# Patient Record
Sex: FEMALE | Race: WHITE | NOT HISPANIC OR LATINO | Employment: STUDENT | URBAN - METROPOLITAN AREA
[De-identification: names, ages, dates, MRNs, and addresses within clinical notes are randomized per-mention and may not be internally consistent; named-entity substitution may affect disease eponyms.]

---
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
## 2024-01-08 ENCOUNTER — TELEPHONE (OUTPATIENT)
Dept: GENETICS | Facility: CLINIC | Age: 19
End: 2024-01-08

## 2024-01-08 ENCOUNTER — TELEPHONE (OUTPATIENT)
Dept: HEMATOLOGY ONCOLOGY | Facility: CLINIC | Age: 19
End: 2024-01-08

## 2024-01-08 PROBLEM — Z15.09 BRCA2 GENE MUTATION POSITIVE IN FEMALE: Status: ACTIVE | Noted: 2024-01-08

## 2024-01-08 PROBLEM — Z15.02 BRCA2 GENE MUTATION POSITIVE IN FEMALE: Status: ACTIVE | Noted: 2024-01-08

## 2024-01-08 PROBLEM — Z15.01 BRCA2 GENE MUTATION POSITIVE IN FEMALE: Status: ACTIVE | Noted: 2024-01-08

## 2024-01-08 NOTE — TELEPHONE ENCOUNTER
Call Transfer   Who are you speaking with?  Patient   If it is not the patient, are they listed on an active communication consent form? N/A   Who is the patients HemOnc/SurgOnc provider? Marina Sheikh   What is the reason for this call? Patient calling to return a phone call from Marina Sheikh.  Phone number provided in note from Marina.  Patient was transferred to Marina Sheikh.   Person/Department that the call was transferred to?    Time that call was transferred?    Marina Sheikh 1:58pm   Your call will be transferred now. If you receive a voicemail, please leave a detailed message and a member of the team will return your call as soon as possible.    Did you relay this information to the caller?  Yes

## 2024-01-08 NOTE — TELEPHONE ENCOUNTER
Rylee lvm returning my call and requested a callback at [phone].     I returned her call and nupur.

## 2024-01-08 NOTE — TELEPHONE ENCOUNTER
BALDEVM requesting that Rylee call [phone] to discuss test results at her earliest convenience.

## 2024-01-08 NOTE — TELEPHONE ENCOUNTER
Post-Test Genetic Counseling Consult Note  Today I spoke with Rylee over the phone to review the results of her genetic test for hereditary cancer. We met previously on 4/5/23 for pre-test counseling.     SUMMARY:    Test(s): [facility] Cancer Panel + RNA (47 genes): APC, AILDA, AXIN2, BARD1, BMPR1A, BRCA1, BRCA2, BRIP1, CDH1, CDK4, CDKN2A, CHEK2, CTNNA1, DICER1, EPCAM, GREM1, HOXB13, KIT, MEN1, MLH1, MSH2, MSH3, MSH6, MUTYH, NBN, NF1, NTHL1, PALB2, PDGFRA, PMS2, POLD1, POLE, PTEN, RAD50, RAD51C, RAD51D, SDHA, SDHB, SDHC, SDHD, SMAD4, SMARCA4, STK11, TP53, TSC1, TSC2, VHL     Result: Positive     Variant 1:   BRCA2 [id number] ([id number]); heterozygous, pathogenic    Assessment:   The BRCA2 gene is associated with autosomal dominant hereditary breast and ovarian cancer (HBOC) syndrome ([employer] UID: 227696) and autosomal recessive Fanconi anemia.  This result is consistent with hereditary breast and ovarian cancer (HBOC) syndrome.      Hereditary breast and ovarian cancer (HBOC) syndrome  Women with a single BRCA2 pathogenic variant have approximately a 40-85% lifetime risk of breast cancer.  The risk for a second primary breast cancer within 5 years of the first breast cancer diagnosis is approximately 6.8-9%.       Women also have up to a 17-27% lifetime risk for ovarian, fallopian tube, or peritoneal cancer to age 70.      Men with a BRCA2 pathogenic variant have a 1.8-7.1% risk for breast cancer and a 19-61% risk for prostate cancer.      Men and women also have an elevated risk for melanoma and pancreatic cancer although the exact lifetime risk is not known.      Reproductive Risks:  If an individual and their partner both carry a pathogenic variant in one copy of the BRCA2 gene, there is a 25% chance that a child would inherit a pathogenic variant in both copies of the BRCA2 gene. Pathogenic variants in both copies of the BRCA2 gene cause Fanconi anemia.     Fanconi anemia is characterized by bone  marrow failure, short stature, abnormal skin pigmentation, developmental delay and malformations of the thumbs, skeletal and central nervous systems.  Individuals with Fanconi anemia also have an increased risk for leukemia and early onset solid tumors.      In some cases, it may be appropriate for a partner to undergo genetic testing to better understand the risk of having a child with this condition.  Rylee and I reviewed that her partner can consider testing for the BRCA2 gene for reproductive implications. Should her partner also be positive for a mutation in this gene, we can provide a referral to maternal fetal medicine for reproductive genetic counseling.  They can discuss options for prenatal diagnosis and/or assisted reproduction such as pre-implantation genetic testing.     Risks and Testing for Family Members:  This test result may help clarify the risk for other family members to develop cancer. This variant was inherited from Rylee's maternal side. Rylee's sisters and maternal aunts have already pursued genetic testing. We recommend that Rylee share these test results with maternal cousins who have not pursued testing yet.     If Rylee's family members have any questions or are interested in testing they can reach out to the main Genetics number at [phone] for additional information.     Management:  Management guidelines for individuals with pathogenic and likely pathogenic BRCA2 variants have been developed by the National Comprehensive Cancer Network.  Please refer to the current NCCN guidelines for the most up to date guidelines.  The recommendations listed below are specific to Rylee and are based on recommendations in the V2.2024 The Genetic/Familial High-Risk Assessment: Breast, Ovarian and Pancreatic Guideline.  These recommendations are subject to change over time and the newest guidelines should be referenced for the most up to date recommendations.       Plan:  WOMEN  Breast Cancer  Screening  -Breast awareness starting at age 18 y  -Clinical breast exam every 6-12 mo, starting at age 25 y     -Age 25-29 y, annual breast MRI screening with and without contrast (or mammogram only if MRI is unavailable) or individualized based on family history if a breast cancer diagnosis before 30 is present.    -Age 30-75y, annual mammogram and breast MRI screening with contrast    -Age ?75 y, management should be considered on an individual basis.    -Discuss option of risk-reducing mastectomy    -For women with a BRCA pathogenic/likely pathogenic variant who are treated for breast cancer and have not had a bilateral mastectomy, screening with annual mammogram and breast MRI should continue as described above.    Gynecologic Cancer Screening  Ovarian Cancer  -Recommend risk-reducing salpingo-oophorectomy (RRSO), typically between 35 and 40y, and upon completion of child bearing. See Risk-Reducing Salpingo-Oophorectomy (RRSO) Protocol in NCCN Guidelines for Ovarian Cancer - Principles of Surgery.    -Because onset of ovarian cancer in individuals with BRCA2 pathogenic/likely pathogenic variants is an average 8-10 years later than individuals with pathogenic/likely pathogenic BRCA1 variants it's reasonable to delay RRSO for management of ovarian cancer risk until 40-45y in patients with BRCA2 pathogenic/likely pathogenic variants unless age at diagnosis in the family warrants earlier age for consideration of prophylactic surgery.    -Salpingectomy alone is not the standard of care for risk reduction, although clinical trials of interval salpingectomy and delayed oophorectomy are ongoing. The concern for risk-reducing salpingectomy alone is that women are still at risk for developing ovarian cancer. In addition, in premenopausal women, oophorectomy likely reduces the risk of developing breast cancer but the magnitude is uncertain and may be gene-specific.    Skin Cancer Screening  -No specific screening  guidelines exist for melanoma, but general melanoma risk management is appropriate, such as annual full-body skin examination and minimizing UV exposure.  Rylee reportedly follows up with a dermatologist annually.     Pancreatic Cancer Screening:  Current NCCN Guidelines recommend pancreatic cancer screening for individuals with an BRCA2 pathogenic variant and a first- or second-degree relative with exocrine pancreatic cancer from the same side of the family as the identified pathogenic/likely pathogenic germline variant. Pancreatic cancer screening typically begins at age 50 (or 10 years younger than the earliest exocrine pancreatic cancer diagnosis in the family, whichever is earlier) and includes contrast-enhanced MRI/MRCP (magnetic resonance cholangiopancreatography) and/or endoscopic ultrasound (EU).      Currently there is no known family history of pancreatic cancer therefore we do not recommend pancreatic screening for Rylee at this time. We did encourage Rylee to keep her healthcare providers updated on any changes to her personal or family history as updated information may change this recommendation.    Other Screening:  There are no additional recommendations based on Rylee's result. she should continue cancer screening and medical management as clinically indicated and as determined appropriate by her healthcare providers.    Additional Information:  A healthy lifestyle will improve overall health and reduce risk for illness. Eating a healthy diet and exercising for 4 hours per week is recommended. Both diet and exercise have been shown to help maintain a healthy weight. Postmenopausal women who are overweight are at higher risk for breast cancer. Moderate to heavy alcohol use can increase the risk for some cancers. Smoking cigarettes can also increase risk for breast, lung, prostate, pancreatic and other cancers.      Summary:   Positive Result: Rylee was strongly encouraged to follow up on with our  office on an annual basis to review the most up to date guidelines as recommendations are subject to change over time

## 2024-01-22 DIAGNOSIS — F41.1 GAD (GENERALIZED ANXIETY DISORDER): ICD-10-CM

## 2024-01-22 DIAGNOSIS — F33.1 MODERATE EPISODE OF RECURRENT MAJOR DEPRESSIVE DISORDER (HCC): ICD-10-CM

## 2024-01-22 RX ORDER — BUSPIRONE HYDROCHLORIDE 10 MG/1
10 TABLET ORAL 2 TIMES DAILY
Qty: 180 TABLET | Refills: 1 | Status: SHIPPED | OUTPATIENT
Start: 2024-01-22

## 2024-01-23 ENCOUNTER — TELEMEDICINE (OUTPATIENT)
Dept: PSYCHIATRY | Facility: CLINIC | Age: 19
End: 2024-01-23
Payer: COMMERCIAL

## 2024-01-23 DIAGNOSIS — F33.1 MODERATE EPISODE OF RECURRENT MAJOR DEPRESSIVE DISORDER (HCC): Primary | ICD-10-CM

## 2024-01-23 DIAGNOSIS — F41.1 GAD (GENERALIZED ANXIETY DISORDER): ICD-10-CM

## 2024-01-23 PROCEDURE — 99214 OFFICE O/P EST MOD 30 MIN: CPT | Performed by: PHYSICIAN ASSISTANT

## 2024-01-23 NOTE — PSYCH
This note was not shared with the patient due to reasonable likelihood of causing patient harm     Virtual Regular Visit    Problem List Items Addressed This Visit    None  Visit Diagnoses       Moderate episode of recurrent major depressive disorder (HCC)    -  Primary    MARIA M (generalized anxiety disorder)              Reason for visit is   Chief Complaint   Patient presents with    Medication Management    Follow-up     Encounter provider Sofy Todd PA-C    Provider located at [address]  [address]  [facility] [id number] [phone]    Recent Visits  No visits were found meeting these conditions.  Showing recent visits within past 7 days and meeting all other requirements  Today's Visits  Date Type Provider Dept   01/23/24 Telemedicine Sofy Todd PA-C  [facility]   Showing today's visits and meeting all other requirements  Future Appointments  No visits were found meeting these conditions.  Showing future appointments within next 150 days and meeting all other requirements       After connecting through [employer], the patient was identified by name and date of birth. Rylee Jones was informed that this is a telemedicine visit and that the visit is being conducted through the Epic Embedded platform. She agrees to proceed. which may not be secure and therefore, might not be HIPAA-compliant.  My office door was closed. No one else was in the room.  She acknowledged consent and understanding of privacy and security of the video platform. The patient has agreed to participate and understands they can discontinue the visit at any time.    SUBJECTIVE:    Rylee Jones is a 18 y.o. female with a history of depression and anxiety who presents for virtual follow-up today.  She reports that she has tapered herself down on her Zoloft.  Initially she was on 75 mg at her last encounter and she decreased the  "dose to 50 mg for 3 weeks and she has been on the 25 mg dosing for 1 week.  She reports wanting to do this because she has consistently felt \"numb and emotionless\" on the Zoloft.  She would like to continue with her BuSpar as she feels it has been helpful for some mild anxiety symptoms.  She shares that she has not noticed any major increase in depressive or anxiety symptoms.  She has been using her coping skills and journaling to help with her mood.  She has not yet looked into therapeutic services at her school.  She notes that her sleep has been difficult but she feels it has been improving.  No changes with appetite.    No suicidal or homicidal thought, plan, or intent.    No auditory or visual hallucinations.  No delusions.    HPI ROS Appetite Changes and Sleep: normal appetite, normal energy level, and normal number of sleep hours    Review Of Systems:     Constitutional Negative   ENT Negative   Cardiovascular Negative   Respiratory Negative   Gastrointestinal Negative   Genitourinary Negative   Musculoskeletal Negative   Integumentary Negative   Neurological Negative   Endocrine Negative   Other Symptoms Negative and None        Substance Abuse History:    Social History     Substance and Sexual Activity   Drug Use No       Family Psychiatric History:     Family History   Problem Relation Age of Onset    No Known Problems Mother     No Known Problems Father        Social History     Socioeconomic History    Marital status: Single     Spouse name: Not on file    Number of children: Not on file    Years of education: Not on file    Highest education level: Not on file   Occupational History    Not on file   Tobacco Use    Smoking status: Never    Smokeless tobacco: Never   Substance and Sexual Activity    Alcohol use: No    Drug use: No    Sexual activity: Not on file   Other Topics Concern    Not on file   Social History Narrative    Not on file     Social Determinants of Health     Financial Resource Strain: " Not on file   Food Insecurity: Not on file   Transportation Needs: Not on file   Physical Activity: Not on file   Stress: Not on file   Social Connections: Not on file   Intimate Partner Violence: Not on file   Housing Stability: Not on file       Past Medical History:   Diagnosis Date    Closed fracture of left proximal humerus 1/16/2018    Patient denies medical problems        Past Surgical History:   Procedure Laterality Date    [facility] DIAGNOSTIC ARTHROSCOPY SHOULDER +- SYNOVIAL BX Left 4/12/2018    Procedure: ARTHROSCOPY SHOULDER, DIAGNOSTIC;  Surgeon: Scar Jamil MD;  [facility]: [facility];  Service: Orthopedics       Current Outpatient Medications   Medication Sig Dispense Refill    busPIRone (BUSPAR) 10 mg tablet TAKE 1 TABLET BY MOUTH TWICE A  tablet 1    sertraline (ZOLOFT) 50 mg tablet TAKE 1 AND 1/2 TABLETS BY MOUTH DAILY 135 tablet 1     No current facility-administered medications for this visit.        No Known Allergies    The following portions of the patient's history were reviewed and updated as appropriate: allergies, current medications, past family history, past medical history, past social history, past surgical history, and problem list.    OBJECTIVE:     Mental Status Examination:    Appearance age appropriate, casually dressed   Behavior pleasant, cooperative   Speech normal volume, normal pitch   Mood Slightly anxious   Affect Mood congruent   Thought Processes logical   Associations intact associations   Thought Content normal   Perceptual Disturbances: none   Abnormal Thoughts  Risk Potential Suicidal ideation - None  Homicidal ideation - None  Potential for aggression - No   Orientation oriented to person, place, time/date and situation   Memory recent and remote memory grossly intact   Cosciousness alert and awake   Attention Span attention span and concentration are age appropriate   Intellect Appears to be of Average Intelligence   Insight age appropriate    Judgement good   "  Muscle Strength and  Gait muscle strength and tone were normal   Language no difficulty naming common objects   Fund of Knowledge displays adequate knowledge of current events   Pain none   Pain Scale 0        Laboratory Results: No results found for this or any previous visit.    Assessment/Plan:       Diagnoses and all orders for this visit:    Moderate episode of recurrent major depressive disorder (HCC)    MARIA M (generalized anxiety disorder)          Treatment Recommendations- Risks Benefits      Patient has been decreasing her dosing of Zoloft on her own due to feeling \"numb and emotionless.\" She is currently taking 25 mg. We discussed importance of speaking with a Provider prior to making medication changes.     Recommend continuing with 25 mg dosing for one week and then discontinue.     Continue the following medication:     Buspar 10 mg BID for anxiety    We will follow up in 1 month or sooner if questions or concerns arise  She is aware of emergent vs non-emergent mental health resources  She is able to contract for safety at this time     Risks, Benefits And Possible Side Effects Of Medications:  discussed    Controlled Medication Discussion: not applicable     Psychotherapy Provided: no    Treatment Plan:    Completed and signed during the session: Not applicable - Treatment Plan not due at this session    I spent 21 minutes with the patient during this visit.    Sofy Todd PA-C 01/23/24  "

## 2024-02-21 PROBLEM — J06.9 ACUTE URI: Status: RESOLVED | Noted: 2018-12-13 | Resolved: 2024-02-21

## 2024-02-21 PROBLEM — B30.9 VIRAL CONJUNCTIVITIS OF BOTH EYES: Status: RESOLVED | Noted: 2019-10-12 | Resolved: 2024-02-21

## 2024-02-23 ENCOUNTER — TELEMEDICINE (OUTPATIENT)
Dept: PSYCHIATRY | Facility: CLINIC | Age: 19
End: 2024-02-23

## 2024-02-23 DIAGNOSIS — Z91.199 NO-SHOW FOR APPOINTMENT: Primary | ICD-10-CM

## 2024-02-23 NOTE — PSYCH
No Call. No Show. No Charge    Rylee Jones no showed 02/23/24 appointment , staff called and left message to reschedule appointment     Treatment Plan not due at this session.

## 2024-04-05 ENCOUNTER — OFFICE VISIT (OUTPATIENT)
Dept: PSYCHIATRY | Facility: CLINIC | Age: 19
End: 2024-04-05
Payer: COMMERCIAL

## 2024-04-05 DIAGNOSIS — F41.1 GAD (GENERALIZED ANXIETY DISORDER): ICD-10-CM

## 2024-04-05 DIAGNOSIS — F33.1 MODERATE EPISODE OF RECURRENT MAJOR DEPRESSIVE DISORDER (HCC): Primary | ICD-10-CM

## 2024-04-05 PROCEDURE — 99214 OFFICE O/P EST MOD 30 MIN: CPT | Performed by: PHYSICIAN ASSISTANT

## 2024-04-05 RX ORDER — BUPROPION HYDROCHLORIDE 150 MG/1
150 TABLET ORAL EVERY MORNING
Qty: 30 TABLET | Refills: 1 | Status: SHIPPED | OUTPATIENT
Start: 2024-04-05 | End: 2024-06-04

## 2024-04-05 NOTE — PSYCH
"  This note was not shared with the patient due to reasonable likelihood of causing patient harm   PROGRESS NOTE        [facility]      Name and Date of Birth:  Rylee Jones 18 y.o. 2005    Date of Visit: 04/05/24    SUBJECTIVE:    Rylee presents today for medication management and follow-up.  She reports \"I have been feeling pretty good.\"  She is very busy with finishing up her semester at school.  She has been utilizing coping skills such as making bread and painting.  She has noticed some mood changes since stopping Zoloft.  She feels more depressed and has less motivation.  She does report that she does not like the way the Zoloft made her feel in other ways and she would like to talk about trialing an alternative medication.    She denies any major changes in sleep or appetite.      She denies suicidal ideation, intent or plan at present, has no suicidal ideation, intent or plan at present.    She denies any auditory hallucinations and visual hallucinations, denies any other delusional thinking, denies any delusional thinking.    She denies any side effects from medications  .  HPI ROS Appetite Changes and Sleep: normal appetite, normal sleep    Review Of Systems:      Constitutional Negative   ENT Negative   Cardiovascular Negative   Respiratory Negative   Gastrointestinal Negative   Genitourinary Negative   Musculoskeletal Negative   Integumentary Negative   Neurological Negative   Endocrine Negative   Other Symptoms Negative and None       Laboratory Results: No results found for this or any previous visit.    Substance Abuse History:    Social History     Substance and Sexual Activity   Drug Use No       Family Psychiatric History:     Family History   Problem Relation Age of Onset    No Known Problems Mother     No Known Problems Father        The following portions of the patient's history were reviewed and updated as appropriate: past family history, " past medical history, past social history, past surgical history and problem list.    Social History     Socioeconomic History    Marital status: Single     Spouse name: Not on file    Number of children: Not on file    Years of education: Not on file    Highest education level: Not on file   Occupational History    Not on file   Tobacco Use    Smoking status: Never    Smokeless tobacco: Never   Substance and Sexual Activity    Alcohol use: No    Drug use: No    Sexual activity: Not on file   Other Topics Concern    Not on file   Social History Narrative    Not on file     Social Determinants of Health     Financial Resource Strain: Not on file   Food Insecurity: Not on file   Transportation Needs: Not on file   Physical Activity: Not on file   Stress: Not on file   Social Connections: Not on file   Intimate Partner Violence: Not on file   Housing Stability: Not on file     Social History     Social History Narrative    Not on file        Social History       Tobacco History       Smoking Status  Never      Smokeless Tobacco Use  Never              Alcohol History       Alcohol Use Status  No              Drug Use       Drug Use Status  No              Sexual Activity       Sexually Active  Not Asked              Activities of Daily Living    Not Asked                       OBJECTIVE:     Mental Status Evaluation:    Appearance age appropriate, casually dressed   Behavior pleasant, cooperative   Speech normal volume, normal pitch   Mood Anxious   Affect Mood congruent   Thought Processes logical   Associations intact associations   Thought Content normal   Perceptual Disturbances: none   Abnormal Thoughts  Risk Potential Suicidal ideation - None  Homicidal ideation - None  Potential for aggression - No   Orientation oriented to person, place, time/date and situation   Memory recent and remote memory grossly intact   Cosciousness alert and awake   Attention Span attention span and concentration are age appropriate    Intellect Appears to be of Average Intelligence   Insight age appropriate    Judgement good    Muscle Strength and  Gait muscle strength and tone were normal   Language no difficulty naming common objects   Fund of Knowledge displays adequate knowledge of current events   Pain none   Pain Scale 0       Assessment/Plan:       Diagnoses and all orders for this visit:    Moderate episode of recurrent major depressive disorder (HCC)  -     buPROPion (Wellbutrin XL) 150 mg 24 hr tablet; Take 1 tablet (150 mg total) by mouth every morning    MARIA M (generalized anxiety disorder)          Treatment Recommendations/Precautions:    Patient is currently looking for an outside psychotherapist.    Patient discontinued Zoloft and she does not want to restart though she mentions feeling more depressed and less motivated.  She would like to trial an alternative medication.  Discussed indication, potential side effects, and benefits of Wellbutrin XL we specifically discussed potential for lowering appetite, lowering seizure threshold, and advised not to combine the medication with alcohol.    Will start Wellbutrin  mg in the morning for depression    Continue current medications:    BuSpar 10 mg twice daily for anxiety    We will follow-up in 6 weeks or sooner if questions or concerns arise.  She is aware of emergent versus nonemergent mental health resources.  She is able to contract for her own safety at this time.    Risks/Benefits      Risks, Benefits And Possible Side Effects Of Medications:    Risks, benefits, and possible side effects of medications explained to patient and patient verbalizes understanding and agreement for treatment.    Controlled Medication Discussion:     Not applicable    Psychotherapy Provided:     Individual psychotherapy provided: No    Visit Start Time:  10:30 AM  Visit End Time:  10:50 AM  Total Visit Duration: 20 minutes    This note was completed in part utilizing Dragon dictation Software.  Grammatical, translation, syntax errors, random word insertions, spelling mistakes, and incomplete sentences may be an occasional consequence of this system secondary to software limitations with voice recognition, ambient noise, and hardware issues. If you have any questions or concerns about the content, text, or information contained within the body of this dictation, please contact the provider for clarification.

## 2024-04-28 DIAGNOSIS — F33.1 MODERATE EPISODE OF RECURRENT MAJOR DEPRESSIVE DISORDER (HCC): ICD-10-CM

## 2024-04-29 RX ORDER — BUPROPION HYDROCHLORIDE 150 MG/1
150 TABLET ORAL EVERY MORNING
Qty: 90 TABLET | Refills: 1 | Status: SHIPPED | OUTPATIENT
Start: 2024-04-29

## 2024-05-20 ENCOUNTER — OFFICE VISIT (OUTPATIENT)
Dept: PSYCHIATRY | Facility: CLINIC | Age: 19
End: 2024-05-20
Payer: COMMERCIAL

## 2024-05-20 DIAGNOSIS — F33.1 MODERATE EPISODE OF RECURRENT MAJOR DEPRESSIVE DISORDER (HCC): Primary | ICD-10-CM

## 2024-05-20 DIAGNOSIS — F41.1 GAD (GENERALIZED ANXIETY DISORDER): ICD-10-CM

## 2024-05-20 PROCEDURE — 99214 OFFICE O/P EST MOD 30 MIN: CPT | Performed by: PHYSICIAN ASSISTANT

## 2024-05-20 RX ORDER — BUPROPION HYDROCHLORIDE 300 MG/1
300 TABLET ORAL EVERY MORNING
Qty: 30 TABLET | Refills: 2 | Status: SHIPPED | OUTPATIENT
Start: 2024-05-20 | End: 2024-08-18

## 2024-05-20 NOTE — BH TREATMENT PLAN
TREATMENT PLAN (Medication Management Only)        [facility]    Name and Date of Birth:  Rylee Jones 18 y.o. 2005  Date of Treatment Plan: May 20, 2024  Diagnosis/Diagnoses:    1. Moderate episode of recurrent major depressive disorder (HCC)      Strengths/Personal Resources for Self-Care: supportive family, taking medications as prescribed, ability to adapt to life changes, ability to communicate needs, ability to communicate well.  Area/Areas of need (in own words): finding a medication that is a good fit - and no side effects   1. Long Term Goal: maintain control of depression.  Target Date:6 months - 11/20/2024  Person/Persons responsible for completion of goal: Rylee  2.  Short Term Objective (s) - How will we reach this goal?:   A. Provider new recommended medication/dosage changes and/or continue medication(s): continue current medications as prescribed.  B. Increase wellbutrin XL for better efficacy   C. N/A.  Target Date:6 months - 11/20/2024  Person/Persons Responsible for Completion of Goal: Rylee  Progress Towards Goals: continuing treatment  Treatment Modality: medication management every 3 months  Review due 180 days from date of this plan: 6 months - 11/20/2024  Expected length of service: ongoing treatment  My Physician/PA/NP and I have developed this plan together and I agree to work on the goals and objectives. I understand the treatment goals that were developed for my treatment.

## 2024-05-20 NOTE — BH CRISIS PLAN
Client Name: Rylee Jones       Client YOB: 2005    [employer] Safety Plan      Creation Date: 5/20/24 Update Date: 5/20/24   Created By: Sofy Todd PA-C Last Updated By: Sofy Todd PA-C      Step 1: Warning Signs:   Warning Signs   Feeling overwhelemed and not knowing how to express my emotions   Having thoughts to hit my head on a wall            Step 2: Internal Coping Strategies:   Internal Coping Strategies   Journaling   Baking   Crotchet   Working out            Step 3: People and social settings that provide distraction:   Name Contact Information   My sister In cell phone            Step 4: People whom I can ask for help during a crisis:      Name Contact Information    My sister In cell phone      Step 5: Professionals or agencies I can contact during a crisis:      [employer]/Agency Name Phone Emergency Contact    Sofy Todd PA-C          Crisis Phone Numbers:   Suicide Prevention Lifeline: Call or Text  984 Crisis Text Line: Text HOME to 310-160   Please note: Some [facility] do not have a separate number for Child/Adolescent specific crisis. If your county is not listed under Child/Adolescent, please call the adult number for your county      Adult Crisis Numbers: Child/Adolescent Crisis Numbers   [facility]: [phone] [facility]: [phone]   [facility]: [phone] [facility]: [phone]   [facility]: [phone] [place]: [phone]   [facility]: [phone] [place]: [phone]   [facility]: [phone]   [facility]: [phone]   [facility]: [phone]   [facility]: [phone] (daytime) [phone] (after hours, weekends, holidays)      Step 6: Making the environment safer (plan for lethal means safety):   Patient did not identify any lethal methods: Yes     Optional: What is most important to me and worth living for?      [employer] Safety Plan.  Kelsey Smiley and Lakhwinder Alfred. Used with permission of the authors.

## 2024-05-20 NOTE — PSYCH
This note was not shared with the patient due to reasonable likelihood of causing patient harm   PROGRESS NOTE        [facility]      Name and Date of Birth:  Rylee Jones 18 y.o. 2005    Date of Visit: 05/20/24    SUBJECTIVE:    Rylee presents today for medication management and follow-up.  She reports that she has been done with her semester since the beginning of May.  She starts her job as a  later this month.  She has been engaging in a lot of her hobbies such as baking and crocheting.  She denies any benefit to starting Wellbutrin.  She denies any negative side effects.  She feels that she has had a lot of low mood symptoms, low motivation and energy.  She reports that it takes a lot of effort to push herself to do things.  She did recently move in with her father which has been an adjustment because she has not lived with him since her childhood.  She also misses her sister who she previously lived with.  She has been using marijuana about 4 times per week.  She denies any alcohol use.    She has been sleeping and getting adequate nutrition.    She denies suicidal ideation, intent or plan at present, has no suicidal ideation, intent or plan at present.    She denies any auditory hallucinations and visual hallucinations, denies any other delusional thinking, denies any delusional thinking.    She denies any side effects from medications  .  HPI ROS Appetite Changes and Sleep: normal appetite, normal sleep    Review Of Systems:      Constitutional Negative   ENT Negative   Cardiovascular Negative   Respiratory Negative   Gastrointestinal Negative   Genitourinary Negative   Musculoskeletal Negative   Integumentary Negative   Neurological Negative   Endocrine Negative   Other Symptoms Negative and None       Laboratory Results: No results found for this or any previous visit.    Substance Abuse History:    Social History     Substance and Sexual  Activity   Drug Use No       Family Psychiatric History:     Family History   Problem Relation Age of Onset    No Known Problems Mother     No Known Problems Father        The following portions of the patient's history were reviewed and updated as appropriate: past family history, past medical history, past social history, past surgical history and problem list.    Social History     Socioeconomic History    Marital status: Single     Spouse name: Not on file    Number of children: Not on file    Years of education: Not on file    Highest education level: Not on file   Occupational History    Not on file   Tobacco Use    Smoking status: Never    Smokeless tobacco: Never   Substance and Sexual Activity    Alcohol use: No    Drug use: No    Sexual activity: Not on file   Other Topics Concern    Not on file   Social History Narrative    Not on file     Social Determinants of Health     Financial Resource Strain: Not on file   Food Insecurity: Not on file   Transportation Needs: Not on file   Physical Activity: Not on file   Stress: Not on file   Social Connections: Not on file   Intimate Partner Violence: Not on file   Housing Stability: Not on file     Social History     Social History Narrative    Not on file        Social History       Tobacco History       Smoking Status  Never      Smokeless Tobacco Use  Never              Alcohol History       Alcohol Use Status  No              Drug Use       Drug Use Status  No              Sexual Activity       Sexually Active  Not Asked              Activities of Daily Living    Not Asked                       OBJECTIVE:     Mental Status Evaluation:    Appearance age appropriate, casually dressed   Behavior pleasant, cooperative   Speech normal volume, normal pitch   Mood Anxious, slightly dysphoric   Affect Mood congruent   Thought Processes logical   Associations intact associations   Thought Content normal   Perceptual Disturbances: none   Abnormal Thoughts  Risk  Potential Suicidal ideation - None  Homicidal ideation - None  Potential for aggression - No   Orientation oriented to person, place, time/date and situation   Memory recent and remote memory grossly intact   Cosciousness alert and awake   Attention Span attention span and concentration are age appropriate   Intellect Appears to be of Average Intelligence   Insight age appropriate    Judgement good    Muscle Strength and  Gait muscle strength and tone were normal   Language no difficulty naming common objects   Fund of Knowledge displays adequate knowledge of current events   Pain none   Pain Scale 0       Assessment/Plan:       Diagnoses and all orders for this visit:    Moderate episode of recurrent major depressive disorder (HCC)  -     buPROPion (WELLBUTRIN XL) 300 mg 24 hr tablet; Take 1 tablet (300 mg total) by mouth every morning    MAIRA M (generalized anxiety disorder)          Treatment Recommendations/Precautions:    Patient request referral be sent internally for psychotherapy.    Will increase Wellbutrin XL to 300 mg for depressed mood    Continue current medications:    BuSpar 10 mg twice daily for anxiety    We will follow-up in 6 weeks or sooner if questions or concerns arise.  She is aware of emergent versus nonemergent mental health resources.  She is able to contract for her own safety at this time.    Risks/Benefits      Risks, Benefits And Possible Side Effects Of Medications:    Risks, benefits, and possible side effects of medications explained to patient and patient verbalizes understanding and agreement for treatment.    Controlled Medication Discussion:     Not applicable    Psychotherapy Provided:     Individual psychotherapy provided: No    Visit Start Time:  11:30 AM  Visit End Time:  11:52 AM  Total Visit Duration: 22 minutes    This note was completed in part utilizing Dragon dictation Software. Grammatical, translation, syntax errors, random word insertions, spelling mistakes, and  incomplete sentences may be an occasional consequence of this system secondary to software limitations with voice recognition, ambient noise, and hardware issues. If you have any questions or concerns about the content, text, or information contained within the body of this dictation, please contact the provider for clarification.

## 2024-06-12 ENCOUNTER — TELEPHONE (OUTPATIENT)
Dept: PSYCHIATRY | Facility: CLINIC | Age: 19
End: 2024-06-12

## 2024-06-12 NOTE — TELEPHONE ENCOUNTER
The patient LVM at the office, requesting a call back. The writer attempted to contact the patient. The writer LVM for the patient to contact the office for assistance.

## 2024-06-20 NOTE — TELEPHONE ENCOUNTER
Patient called and lvm requesting call back, writer called patient back and lvm for her to call back at her earliest convenience

## 2024-07-17 ENCOUNTER — TELEMEDICINE (OUTPATIENT)
Dept: PSYCHIATRY | Facility: CLINIC | Age: 19
End: 2024-07-17
Payer: COMMERCIAL

## 2024-07-17 DIAGNOSIS — F41.1 GAD (GENERALIZED ANXIETY DISORDER): ICD-10-CM

## 2024-07-17 DIAGNOSIS — F33.1 MODERATE EPISODE OF RECURRENT MAJOR DEPRESSIVE DISORDER (HCC): Primary | ICD-10-CM

## 2024-07-17 PROCEDURE — 99214 OFFICE O/P EST MOD 30 MIN: CPT | Performed by: PHYSICIAN ASSISTANT

## 2024-07-17 RX ORDER — BUPROPION HYDROCHLORIDE 300 MG/1
300 TABLET ORAL EVERY MORNING
Qty: 30 TABLET | Refills: 2 | Status: SHIPPED | OUTPATIENT
Start: 2024-07-17 | End: 2024-10-15

## 2024-07-17 RX ORDER — SPIRONOLACTONE 50 MG/1
50 TABLET, FILM COATED ORAL DAILY
COMMUNITY
Start: 2024-07-02

## 2024-07-17 RX ORDER — BUSPIRONE HYDROCHLORIDE 10 MG/1
10 TABLET ORAL 2 TIMES DAILY
Qty: 180 TABLET | Refills: 1 | Status: SHIPPED | OUTPATIENT
Start: 2024-07-17

## 2024-07-17 NOTE — PSYCH
This note was not shared with the patient due to reasonable likelihood of causing patient harm     Virtual Regular Visit    Problem List Items Addressed This Visit    None  Visit Diagnoses       Moderate episode of recurrent major depressive disorder (HCC)    -  Primary    Relevant Medications    busPIRone (BUSPAR) 10 mg tablet    buPROPion (WELLBUTRIN XL) 300 mg 24 hr tablet    MARIA M (generalized anxiety disorder)        Relevant Medications    busPIRone (BUSPAR) 10 mg tablet    buPROPion (WELLBUTRIN XL) 300 mg 24 hr tablet          Reason for visit is   Chief Complaint   Patient presents with    Medication Management    Follow-up     Encounter provider Sofy Todd PA-C    Provider located at [facility] [address]  [address]  [facility] [id number] [phone]    Recent Visits  No visits were found meeting these conditions.  Showing recent visits within past 7 days and meeting all other requirements  Today's Visits  Date Type Provider Dept   07/17/24 Telemedicine Sofy Todd PA-C  [facility]   Showing today's visits and meeting all other requirements  Future Appointments  No visits were found meeting these conditions.  Showing future appointments within next 150 days and meeting all other requirements       After connecting through [email or web address], the patient was identified by name and date of birth. Rylee Jones was informed that this is a telemedicine visit and that the visit is being conducted through the Epic Embedded platform. She agrees to proceed. which may not be secure and therefore, might not be HIPAA-compliant.  My office door was closed. No one else was in the room.  She acknowledged consent and understanding of privacy and security of the video platform. The patient has agreed to participate and understands they can discontinue the visit at any time.    SUBJECTIVE:    Rylee Jones is a 18 y.o. female  with a history of depression and anxiety who presents for virtual follow-up today.  She reports that she is currently on vacation so she is presenting for virtual appointment.  She reports that she has been doing fairly well over the last few weeks.  She is been working a lot.  She is spending time outside of working with friends and her sister.  She is excited to go back to school for the fall semester.  She has new roommates that she was just assigned.    She does note a little bit higher level of anxiety especially while driving.  She has been better about taking her BuSpar twice a day.  She does feel that it is helpful.  She is unsure if the increase in Wellbutrin has made her anxiety heightened.  At this time she would like to remain on her current medications and dosing.  She may be interested in making a change at the next appointment.    Today she does bring up needing paperwork filled out for an emotional support animal.  She does have a cat named Kam that she would like to take with her to her school housing.    No suicidal or homicidal thought, plan, or intent.    No auditory or visual hallucinations.  No delusions.    HPI ROS Appetite Changes and Sleep: normal appetite, normal energy level, and normal number of sleep hours    Review Of Systems:     Constitutional Negative   ENT Negative   Cardiovascular Negative   Respiratory Negative   Gastrointestinal Negative   Genitourinary Negative   Musculoskeletal Negative   Integumentary Negative   Neurological Negative   Endocrine Negative   Other Symptoms Negative and None        Substance Abuse History:    Social History     Substance and Sexual Activity   Drug Use No       Family Psychiatric History:     Family History   Problem Relation Age of Onset    No Known Problems Mother     No Known Problems Father        Social History     Socioeconomic History    Marital status: Single     Spouse name: Not on file    Number of children: Not on file    Years of  education: Not on file    Highest education level: Not on file   Occupational History    Not on file   Tobacco Use    Smoking status: Never    Smokeless tobacco: Never   Substance and Sexual Activity    Alcohol use: No    Drug use: No    Sexual activity: Not on file   Other Topics Concern    Not on file   Social History Narrative    Not on file     Social Determinants of Health     Financial Resource Strain: Not on file   Food Insecurity: Not on file   Transportation Needs: Not on file   Physical Activity: Not on file   Stress: Not on file   Social Connections: Not on file   Intimate Partner Violence: Not on file   Housing Stability: Not on file       Past Medical History:   Diagnosis Date    Closed fracture of left proximal humerus 1/16/2018    Patient denies medical problems        Past Surgical History:   Procedure Laterality Date    DE DIAGNOSTIC ARTHROSCOPY SHOULDER +- SYNOVIAL BX Left 4/12/2018    Procedure: ARTHROSCOPY SHOULDER, DIAGNOSTIC;  Surgeon: Scar Jamil MD;  Location: [facility];  Service: Orthopedics       Current Outpatient Medications   Medication Sig Dispense Refill    buPROPion (WELLBUTRIN XL) 300 mg 24 hr tablet Take 1 tablet (300 mg total) by mouth every morning 30 tablet 2    busPIRone (BUSPAR) 10 mg tablet Take 1 tablet (10 mg total) by mouth 2 (two) times a day 180 tablet 1    spironolactone (ALDACTONE) 50 mg tablet Take 50 mg by mouth daily       No current facility-administered medications for this visit.        No Known Allergies    The following portions of the patient's history were reviewed and updated as appropriate: allergies, current medications, past family history, past medical history, past social history, past surgical history, and problem list.    OBJECTIVE:     Mental Status Examination:    Appearance age appropriate, casually dressed   Behavior pleasant, cooperative   Speech normal volume, normal pitch   Mood Neutral   Affect Mood congruent   Thought Processes logical    Associations intact associations   Thought Content normal   Perceptual Disturbances: none   Abnormal Thoughts  Risk Potential Suicidal ideation - None  Homicidal ideation - None  Potential for aggression - No   Orientation oriented to person, place, time/date and situation   Memory recent and remote memory grossly intact   Cosciousness alert and awake   Attention Span attention span and concentration are age appropriate   Intellect Appears to be of Average Intelligence   Insight age appropriate    Judgement good    Muscle Strength and  Gait muscle strength and tone were normal   Language no difficulty naming common objects   Fund of Knowledge displays adequate knowledge of current events   Pain none   Pain Scale 0        Laboratory Results: No results found for this or any previous visit.    Assessment/Plan:       Diagnoses and all orders for this visit:    Moderate episode of recurrent major depressive disorder (HCC)  -     busPIRone (BUSPAR) 10 mg tablet; Take 1 tablet (10 mg total) by mouth 2 (two) times a day  -     buPROPion (WELLBUTRIN XL) 300 mg 24 hr tablet; Take 1 tablet (300 mg total) by mouth every morning    MARIA M (generalized anxiety disorder)  -     busPIRone (BUSPAR) 10 mg tablet; Take 1 tablet (10 mg total) by mouth 2 (two) times a day    Other orders  -     spironolactone (ALDACTONE) 50 mg tablet; Take 50 mg by mouth daily          Treatment Recommendations- Risks Benefits      Patient may want to consider alternative medication options at the next appointment.    Continue the following medication:       Wellbutrin  mg in the morning for depressed mood  Buspar 10 mg BID for anxiety    We will follow up in 1-2 months or sooner if questions or concerns arise  She is aware of emergent vs non-emergent mental health resources  She is able to contract for safety at this time     Risks, Benefits And Possible Side Effects Of Medications:  discussed    Controlled Medication Discussion: not applicable      Psychotherapy Provided: no    Treatment Plan:    Completed and signed during the session: Not applicable - Treatment Plan not due at this session    I spent 20 minutes with the patient during this visit.    Sofy Todd PA-C 07/17/24

## 2024-07-28 DIAGNOSIS — F33.1 MODERATE EPISODE OF RECURRENT MAJOR DEPRESSIVE DISORDER (HCC): ICD-10-CM

## 2024-07-29 ENCOUNTER — TELEPHONE (OUTPATIENT)
Dept: PSYCHIATRY | Facility: CLINIC | Age: 19
End: 2024-07-29

## 2024-07-29 RX ORDER — BUPROPION HYDROCHLORIDE 300 MG/1
300 TABLET ORAL EVERY MORNING
Qty: 90 TABLET | Refills: 1 | Status: SHIPPED | OUTPATIENT
Start: 2024-07-29 | End: 2024-10-27

## 2024-07-29 NOTE — TELEPHONE ENCOUNTER
Mom drop form to be filled out but a YEFRI need to be signed by pt in person at the office or one can be sent e-mail but would have to be notarized, sent back PDF and original send back to office by mail.

## 2024-07-29 NOTE — TELEPHONE ENCOUNTER
Patient came in office and completed YEFRI for [employer] (Emotional Support Animal).  Patient picked up letter completed by Sofy Todd PA-C

## 2024-08-12 ENCOUNTER — TELEPHONE (OUTPATIENT)
Age: 19
End: 2024-08-12

## 2024-08-12 ENCOUNTER — TELEMEDICINE (OUTPATIENT)
Dept: PSYCHIATRY | Facility: CLINIC | Age: 19
End: 2024-08-12
Payer: COMMERCIAL

## 2024-08-12 DIAGNOSIS — F33.1 MODERATE EPISODE OF RECURRENT MAJOR DEPRESSIVE DISORDER (HCC): Primary | ICD-10-CM

## 2024-08-12 DIAGNOSIS — F41.1 GAD (GENERALIZED ANXIETY DISORDER): ICD-10-CM

## 2024-08-12 DIAGNOSIS — Z91.199 NO-SHOW FOR APPOINTMENT: Primary | ICD-10-CM

## 2024-08-12 PROCEDURE — 99214 OFFICE O/P EST MOD 30 MIN: CPT | Performed by: PHYSICIAN ASSISTANT

## 2024-08-12 RX ORDER — BUPROPION HYDROCHLORIDE 150 MG/1
150 TABLET ORAL EVERY MORNING
Qty: 7 TABLET | Refills: 0 | Status: SHIPPED | OUTPATIENT
Start: 2024-08-12 | End: 2024-08-19

## 2024-08-12 RX ORDER — DULOXETIN HYDROCHLORIDE 20 MG/1
20 CAPSULE, DELAYED RELEASE ORAL DAILY
Qty: 30 CAPSULE | Refills: 1 | Status: SHIPPED | OUTPATIENT
Start: 2024-08-12 | End: 2024-10-11

## 2024-08-12 NOTE — TELEPHONE ENCOUNTER
Patient called in for Wait List Inquiry for services.  Writer verified Full Name, , Callback Number, Address, and Insurance. Writer spoke with pt who stated that Sofy Todd had referred her for talk therapy services. Writer added pt to wait list.

## 2024-08-12 NOTE — TELEPHONE ENCOUNTER
Patient called in stating they had missed their virtual medication management appointment today (8/12/24 @11:30)    Writer reached out to provider and made sure the provider would be able to reschedule with them. Provider stated they would be able to be rescheduled.    Patient is now rescheduled to today (8/12/24 @2:30) via [phone]

## 2024-08-12 NOTE — PSYCH
No Call. No Show. No Charge    Rylee Jones no showed 08/12/24 appointment , staff called and left message to reschedule appointment     Treatment Plan not due at this session.

## 2024-08-13 NOTE — PSYCH
This note was not shared with the patient due to reasonable likelihood of causing patient harm     Virtual Regular Visit    Problem List Items Addressed This Visit    None  Visit Diagnoses       Moderate episode of recurrent major depressive disorder (HCC)    -  Primary    Relevant Medications    buPROPion (WELLBUTRIN XL) 150 mg 24 hr tablet    DULoxetine (CYMBALTA) 20 mg capsule    MARIA M (generalized anxiety disorder)        Relevant Medications    buPROPion (WELLBUTRIN XL) 150 mg 24 hr tablet    DULoxetine (CYMBALTA) 20 mg capsule          Reason for visit is   Chief Complaint   Patient presents with    Medication Management    Follow-up     Encounter provider Sofy Todd PA-C    Provider located at [facility] [address]  [address]  [facility] [address] [phone]    Recent Visits  No visits were found meeting these conditions.  Showing recent visits within past 7 days and meeting all other requirements  Today's Visits  Date Type Provider Dept   08/12/24 Telemedicine Sofy Todd PA-C  Psychiatric [facility]   Showing today's visits and meeting all other requirements  Future Appointments  No visits were found meeting these conditions.  Showing future appointments within next 150 days and meeting all other requirements       After connecting through [employer], the patient was identified by name and date of birth. Rylee Jones was informed that this is a telemedicine visit and that the visit is being conducted through the Epic Embedded platform. She agrees to proceed. which may not be secure and therefore, might not be HIPAA-compliant.  My office door was closed. No one else was in the room.  She acknowledged consent and understanding of privacy and security of the video platform. The patient has agreed to participate and understands they can discontinue the visit at any time.    SUBJECTIVE:    Rylee Jones is a 18 y.o.  "female with a history of depression and anxiety who presents for virtual follow-up today.  She reports that she has been busy finishing up a summer class.  She reports that she is really enjoying it but it is taking up a lot of her time.  She is currently taking music therapy.  She shares that she has been quitting her job because it was not working out.  She is looking forward to returning to school at the end of the month.  She shares that recently she has had more anxiety.  She has been using her BuSpar and feels it has been helpful.  She reports a lot of low motivation, low energy, and \"I have to push myself to do things.\"  She does not feel that Wellbutrin has been helpful for any of her depressive symptoms.      She has been sleeping and getting adequate nutrition.    She reports that she is waiting for the scheduling of psychotherapy.    No suicidal or homicidal thought, plan, or intent.    No auditory or visual hallucinations.  No delusions.    HPI ROS Appetite Changes and Sleep: normal appetite, normal energy level, and normal number of sleep hours    Review Of Systems:     Constitutional Negative   ENT Negative   Cardiovascular Negative   Respiratory Negative   Gastrointestinal Negative   Genitourinary Negative   Musculoskeletal Negative   Integumentary Negative   Neurological Negative   Endocrine Negative   Other Symptoms Negative and None        Substance Abuse History:    Social History     Substance and Sexual Activity   Drug Use No       Family Psychiatric History:     Family History   Problem Relation Age of Onset    No Known Problems Mother     No Known Problems Father        Social History     Socioeconomic History    Marital status: Single     Spouse name: Not on file    Number of children: Not on file    Years of education: Not on file    Highest education level: Not on file   Occupational History    Not on file   Tobacco Use    Smoking status: Never    Smokeless tobacco: Never   Substance and " Sexual Activity    Alcohol use: No    Drug use: No    Sexual activity: Not on file   Other Topics Concern    Not on file   Social History Narrative    Not on file     Social Determinants of Health     Financial Resource Strain: Not on file   Food Insecurity: Not on file   Transportation Needs: Not on file   Physical Activity: Not on file   Stress: Not on file   Social Connections: Not on file   Intimate Partner Violence: Not on file   Housing Stability: Not on file       Past Medical History:   Diagnosis Date    Closed fracture of left proximal humerus 1/16/2018    Patient denies medical problems        Past Surgical History:   Procedure Laterality Date    [place] DIAGNOSTIC ARTHROSCOPY SHOULDER +- SYNOVIAL BX Left 4/12/2018    Procedure: ARTHROSCOPY SHOULDER, DIAGNOSTIC;  Surgeon: Scar Jamil MD;  Location: [facility];  Service: Orthopedics       Current Outpatient Medications   Medication Sig Dispense Refill    buPROPion (WELLBUTRIN XL) 150 mg 24 hr tablet Take 1 tablet (150 mg total) by mouth every morning for 7 days 7 tablet 0    DULoxetine (CYMBALTA) 20 mg capsule Take 1 capsule (20 mg total) by mouth daily 30 capsule 1    busPIRone (BUSPAR) 10 mg tablet Take 1 tablet (10 mg total) by mouth 2 (two) times a day 180 tablet 1    spironolactone (ALDACTONE) 50 mg tablet Take 50 mg by mouth daily       No current facility-administered medications for this visit.        No Known Allergies    The following portions of the patient's history were reviewed and updated as appropriate: allergies, current medications, past family history, past medical history, past social history, past surgical history, and problem list.    OBJECTIVE:     Mental Status Examination:    Appearance age appropriate, casually dressed   Behavior pleasant, cooperative   Speech normal volume, normal pitch   Mood Dysphoric, slightly irritable   Affect Mood congruent   Thought Processes logical   Associations intact associations   Thought Content  normal   Perceptual Disturbances: none   Abnormal Thoughts  Risk Potential Suicidal ideation - None  Homicidal ideation - None  Potential for aggression - No   Orientation oriented to person, place, time/date and situation   Memory recent and remote memory grossly intact   Cosciousness alert and awake   Attention Span attention span and concentration are age appropriate   Intellect Appears to be of Average Intelligence   Insight age appropriate    Judgement good    Muscle Strength and  Gait muscle strength and tone were normal   Language no difficulty naming common objects   Fund of Knowledge displays adequate knowledge of current events   Pain none   Pain Scale 0        Laboratory Results: No results found for this or any previous visit.    Assessment/Plan:       Diagnoses and all orders for this visit:    Moderate episode of recurrent major depressive disorder (HCC)  -     buPROPion (WELLBUTRIN XL) 150 mg 24 hr tablet; Take 1 tablet (150 mg total) by mouth every morning for 7 days  -     DULoxetine (CYMBALTA) 20 mg capsule; Take 1 capsule (20 mg total) by mouth daily    MARIA M (generalized anxiety disorder)          Treatment Recommendations- Risks Benefits      Patient does not feel that the Wellbutrin has been helpful for symptoms.  Will decrease Wellbutrin XL to 150 mg for 1 week and then discontinue.  Discussed indication, potential side effects, and benefits of Cymbalta.  Will start Cymbalta 20 mg in the morning for depression and anxiety    We did discuss signs and symptoms of serotonin syndrome given current regimen.    Continue the following medication:       Buspar 10 mg BID for anxiety    We will follow up in 1-2 months or sooner if questions or concerns arise  She is aware of emergent vs non-emergent mental health resources  She is able to contract for safety at this time     Risks, Benefits And Possible Side Effects Of Medications:  discussed    Controlled Medication Discussion: not applicable      Psychotherapy Provided: no    Treatment Plan:    Completed and signed during the session: Not applicable - Treatment Plan not due at this session    I spent 19 minutes with the patient during this visit.    This note was completed in part utilizing Dragon dictation Software. Grammatical, translation, syntax errors, random word insertions, spelling mistakes, and incomplete sentences may be an occasional consequence of this system secondary to software limitations with voice recognition, ambient noise, and hardware issues. If you have any questions or concerns about the content, text, or information contained within the body of this dictation, please contact the provider for clarification.     Sofy Todd PA-C 08/12/24

## 2024-08-15 NOTE — TELEPHONE ENCOUNTER
Contacted patient off of Talk Therapy  to verify needs of services in attempts to offer patient an appointment. LVM for patient to contact intake dept  in regards to wait list for possible scheduling.     1st attempt/existing pt of med mgmt

## 2024-08-19 NOTE — TELEPHONE ENCOUNTER
Patient contacted intake regarding previous encounter, pt was still interested in scheduling for TT. Writer scheduled pt to see Stephanie Cerna on 11/27/24 at 12:00 pm.     Patient was removed from wait list

## 2024-09-05 DIAGNOSIS — F33.1 MODERATE EPISODE OF RECURRENT MAJOR DEPRESSIVE DISORDER (HCC): ICD-10-CM

## 2024-09-05 RX ORDER — DULOXETIN HYDROCHLORIDE 20 MG/1
20 CAPSULE, DELAYED RELEASE ORAL DAILY
Qty: 90 CAPSULE | Refills: 1 | Status: SHIPPED | OUTPATIENT
Start: 2024-09-05

## 2024-09-19 ENCOUNTER — TELEPHONE (OUTPATIENT)
Dept: PSYCHIATRY | Facility: CLINIC | Age: 19
End: 2024-09-19

## 2024-09-19 NOTE — TELEPHONE ENCOUNTER
Pt returning previous writer's call in regards to scheduling a sooner intake with Stephanie. Pt transferred to [place] office to assist.

## 2024-09-20 ENCOUNTER — OFFICE VISIT (OUTPATIENT)
Dept: BEHAVIORAL/MENTAL HEALTH CLINIC | Facility: CLINIC | Age: 19
End: 2024-09-20
Payer: COMMERCIAL

## 2024-09-20 DIAGNOSIS — F33.1 MAJOR DEPRESSIVE DISORDER, RECURRENT, MODERATE (HCC): Primary | ICD-10-CM

## 2024-09-20 PROCEDURE — 90791 PSYCH DIAGNOSTIC EVALUATION: CPT | Performed by: SOCIAL WORKER

## 2024-10-10 ENCOUNTER — TELEPHONE (OUTPATIENT)
Age: 19
End: 2024-10-10

## 2024-10-10 NOTE — TELEPHONE ENCOUNTER
Patient contacted the office to schedule a follow up visit with provider. Patient is now scheduled for 10/28/24  at 8am virtually.

## 2024-10-18 ENCOUNTER — TELEMEDICINE (OUTPATIENT)
Dept: BEHAVIORAL/MENTAL HEALTH CLINIC | Facility: CLINIC | Age: 19
End: 2024-10-18
Payer: COMMERCIAL

## 2024-10-18 DIAGNOSIS — F33.1 MAJOR DEPRESSIVE DISORDER, RECURRENT, MODERATE (HCC): Primary | ICD-10-CM

## 2024-10-18 PROCEDURE — 90834 PSYTX W PT 45 MINUTES: CPT | Performed by: SOCIAL WORKER

## 2024-10-23 NOTE — PSYCH
Virtual Regular Visit    Verification of patient location:    Patient is located at Home in the following state in which I hold an active license [place]      Assessment/Plan:    Problem List Items Addressed This Visit       Major depressive disorder, recurrent, moderate (HCC) - Primary       Goals addressed in session: Goal 1          Reason for visit is No chief complaint on file.       Encounter provider Stephanie Cerna LCSW      Recent Visits  Date Type Provider Dept   10/18/24 Telemedicine Stephanie Cerna LCSW  [facility]   Showing recent visits within past 7 days and meeting all other requirements  Future Appointments  No visits were found meeting these conditions.  Showing future appointments within next 150 days and meeting all other requirements       The patient was identified by name and date of birth. Rylee Jones was informed that this is a telemedicine visit and that the visit is being conducted throughthe Epic Embedded platform. She agrees to proceed..  My office door was closed. No one else was in the room.  She acknowledged consent and understanding of privacy and security of the video platform. The patient has agreed to participate and understands they can discontinue the visit at any time.    Patient is aware this is a billable service.     Subjective  Rylee Jones is a 19 y.o. female  .      HPI     Past Medical History:   Diagnosis Date    Closed fracture of left proximal humerus 1/16/2018    Patient denies medical problems        Past Surgical History:   Procedure Laterality Date    [place] DIAGNOSTIC ARTHROSCOPY SHOULDER +- SYNOVIAL BX Left 4/12/2018    Procedure: ARTHROSCOPY SHOULDER, DIAGNOSTIC;  Surgeon: Scar Jamil MD;  Location: [facility];  Service: Orthopedics       Current Outpatient Medications   Medication Sig Dispense Refill    buPROPion (WELLBUTRIN XL) 150 mg 24 hr tablet Take 1 tablet (150 mg total) by mouth every morning for 7 days 7 tablet 0     "busPIRone (BUSPAR) 10 mg tablet Take 1 tablet (10 mg total) by mouth 2 (two) times a day 180 tablet 1    DULoxetine (CYMBALTA) 20 mg capsule TAKE 1 CAPSULE BY MOUTH EVERY DAY 90 capsule 1    spironolactone (ALDACTONE) 50 mg tablet Take 50 mg by mouth daily       No current facility-administered medications for this visit.        No Known Allergies    Review of Systems    Video Exam    There were no vitals filed for this visit.    Physical Exam     Visit Time    Visit Start Time: 15:00  Visit Stop Time: 15:50  Total Visit Duration:  50 minutes            Behavioral Health Psychotherapy Progress Note    Psychotherapy Provided: Individual Psychotherapy     1. Major depressive disorder, recurrent, moderate (HCC)            Goals addressed in session: Goal 1     DATA: Rylee reports feeling ok overall. She shared feeling somewhat bothered by recent school stress but more importantly, by her sister, who appears to have less interest in Rylee and more in \"dumping her problems\" on her. Processed what this is like for her and how the relationship between she and mom mimics the same dynamic as well. Acknowledged her sense of sadness as connection to her grief and loss. Worked on developing an understanding that her feelings are no something to be ignored or deleted but rather, honored and taken care of, just like she would an open physical wound. Discussed ways to meet emotional needs of being seen and acknowledged in a physical way. Will spend time with friends later this afternoon and weekend. No new symptoms or safety concerns. Return in 2 weeks.   During this session, this clinician used the following therapeutic modalities: Client-centered Therapy    Substance Abuse was not addressed during this session. If the client is diagnosed with a co-occurring substance use disorder, please indicate any changes in the frequency or amount of use: NA. Stage of change for addressing substance use diagnoses: No substance use/Not " "applicable    ASSESSMENT:  Rylee Jones presents with a Euthymic/ normal mood.     her affect is Normal range and intensity, which is congruent, with her mood and the content of the session. The client has made progress on their goals.    During this session the client was oriented to person, place, and time. The client was engaged in the session. The client was able to sustain direct eye contact and was without psychomotor agitation. The client's thought processes were linear and clear.   Rylee Jones presents with a none risk of suicide, none risk of self-harm, and none risk of harm to others.    For any risk assessment that surpasses a \"low\" rating, a safety plan must be developed.    A safety plan was indicated: no  If yes, describe in detail NA    PLAN: Between sessions, Rylee Jones will take medication as prescribed and practice positive coping strategies as needed . At the next session, the therapist will use Client-centered Therapy to address stressors.    Behavioral Health Treatment Plan and Discharge Planning: Rylee Jones is aware of and agrees to continue to work on their treatment plan. They have identified and are working toward their discharge goals. yes    Visit start and stop times:    10/23/24  Start Time: 1500  Stop Time: 1550  Total Visit Time: 50 minutes  "

## 2024-10-28 ENCOUNTER — TELEMEDICINE (OUTPATIENT)
Dept: PSYCHIATRY | Facility: CLINIC | Age: 19
End: 2024-10-28
Payer: COMMERCIAL

## 2024-10-28 DIAGNOSIS — F41.1 GAD (GENERALIZED ANXIETY DISORDER): ICD-10-CM

## 2024-10-28 DIAGNOSIS — F33.1 MODERATE EPISODE OF RECURRENT MAJOR DEPRESSIVE DISORDER (HCC): Primary | ICD-10-CM

## 2024-10-28 PROBLEM — F50.9 EATING DISORDER, UNSPECIFIED: Status: ACTIVE | Noted: 2022-07-28

## 2024-10-28 PROCEDURE — 99214 OFFICE O/P EST MOD 30 MIN: CPT | Performed by: PHYSICIAN ASSISTANT

## 2024-10-28 RX ORDER — BUSPIRONE HYDROCHLORIDE 10 MG/1
10 TABLET ORAL 2 TIMES DAILY
Qty: 180 TABLET | Refills: 1 | Status: SHIPPED | OUTPATIENT
Start: 2024-10-28

## 2024-10-28 RX ORDER — DULOXETINE 40 MG/1
40 CAPSULE, DELAYED RELEASE ORAL DAILY
Qty: 30 CAPSULE | Refills: 1 | Status: SHIPPED | OUTPATIENT
Start: 2024-10-28 | End: 2024-12-27

## 2024-10-28 NOTE — ASSESSMENT & PLAN NOTE
Recommend patient follow-up with nutrition/dietitian at school.  Advised to reach out if she needs additional resources.

## 2024-10-28 NOTE — PSYCH
This note was not shared with the patient due to reasonable likelihood of causing patient harm     Virtual Regular Visit    Problem List Items Addressed This Visit       Moderate episode of recurrent major depressive disorder (HCC) - Primary     Continue with titration of Cymbalta will increase to 40 mg daily.  Patient has restarted psychotherapy.         Relevant Medications    busPIRone (BUSPAR) 10 mg tablet    DULoxetine 40 MG CPEP    MARIA M (generalized anxiety disorder)     See MDD         Relevant Medications    busPIRone (BUSPAR) 10 mg tablet    DULoxetine 40 MG CPEP     Reason for visit is   Chief Complaint   Patient presents with    Medication Management    Follow-up     Encounter provider Sofy Todd PA-C    Provider located at [address]  [address]  [facility] [address] [phone]    Recent Visits  No visits were found meeting these conditions.  Showing recent visits within past 7 days and meeting all other requirements  Today's Visits  Date Type Provider Dept   10/28/24 Telemedicine Sofy Todd PA-C  Psychiatric [facility]   Showing today's visits and meeting all other requirements  Future Appointments  No visits were found meeting these conditions.  Showing future appointments within next 150 days and meeting all other requirements       After connecting through [employer], the patient was identified by name and date of birth. Rylee Jones was informed that this is a telemedicine visit and that the visit is being conducted through the Epic Embedded platform. She agrees to proceed. which may not be secure and therefore, might not be HIPAA-compliant.  My office door was closed. No one else was in the room.  She acknowledged consent and understanding of privacy and security of the video platform. The patient has agreed to participate and understands they can discontinue the visit at any  "time.    SUBJECTIVE:    Rylee Jones is a 19 y.o. female with a history of depression and anxiety who presents for virtual follow-up today.  She describes her mood as \"okay.\"  She shares that she has been doing well in her classes.  Outside of classes she has been engaging in \"baking, concerts, hanging with friends.\"  She shares that her anxiety has been fairly well-controlled but her depression has been worsening.  She then reports that she has been feeling a little bit lonely at school.  She did recently start psychotherapy and feels that this will be helpful for her.  She has been sleeping well.  Due to the increased stress level she feels her eating disorder symptoms have increased.  She does not like to talk about the details of this and this provider is unsure of the extent to her symptoms.  She is insightful and feels it would be helpful for her to speak with a nutritionist.  She does have a dietitian services at school and is going to look into them.    She admits to some thoughts of self-harm and engaging in superficial cutting.  She denies that any of her self-harm has required medical attention.  She shares that she is going to discuss this with her therapist at her upcoming appointment.    No suicidal or homicidal thought, plan, or intent.    No auditory or visual hallucinations.  No delusions.    HPI ROS Appetite Changes and Sleep: normal appetite, normal energy level, and normal number of sleep hours    Review Of Systems:     Constitutional Negative   ENT Negative   Cardiovascular Negative   Respiratory Negative   Gastrointestinal Negative   Genitourinary Negative   Musculoskeletal Negative   Integumentary Negative   Neurological Negative   Endocrine Negative   Other Symptoms Negative and None        Substance Abuse History:    Social History     Substance and Sexual Activity   Drug Use No       Family Psychiatric History:     Family History   Problem Relation Age of Onset    No Known Problems Mother  "    No Known Problems Father        Social History     Socioeconomic History    Marital status: Single     Spouse name: Not on file    Number of children: Not on file    Years of education: Not on file    Highest education level: Not on file   Occupational History    Not on file   Tobacco Use    Smoking status: Never    Smokeless tobacco: Never   Substance and Sexual Activity    Alcohol use: No    Drug use: No    Sexual activity: Not on file   Other Topics Concern    Not on file   Social History Narrative    Not on file     Social Determinants of Health     Financial Resource Strain: Not on file   Food Insecurity: Not on file   Transportation Needs: Not on file   Physical Activity: Not on file   Stress: Not on file   Social Connections: Not on file   Intimate Partner Violence: Not on file   Housing Stability: Not on file       Past Medical History:   Diagnosis Date    Closed fracture of left proximal humerus 1/16/2018    Patient denies medical problems        Past Surgical History:   Procedure Laterality Date    [place] DIAGNOSTIC ARTHROSCOPY SHOULDER +- SYNOVIAL BX Left 4/12/2018    Procedure: ARTHROSCOPY SHOULDER, DIAGNOSTIC;  Surgeon: Scar Jamil MD;  Location: [facility];  Service: Orthopedics       Current Outpatient Medications   Medication Sig Dispense Refill    busPIRone (BUSPAR) 10 mg tablet Take 1 tablet (10 mg total) by mouth 2 (two) times a day 180 tablet 1    DULoxetine 40 MG CPEP Take 1 capsule (40 mg total) by mouth daily 30 capsule 1    buPROPion (WELLBUTRIN XL) 150 mg 24 hr tablet Take 1 tablet (150 mg total) by mouth every morning for 7 days 7 tablet 0    spironolactone (ALDACTONE) 50 mg tablet Take 50 mg by mouth daily       No current facility-administered medications for this visit.        No Known Allergies    The following portions of the patient's history were reviewed and updated as appropriate: allergies, current medications, past family history, past medical history, past social history,  past surgical history, and problem list.    OBJECTIVE:     Mental Status Examination:    Appearance age appropriate, casually dressed   Behavior Guarded but cooperative   Speech normal volume, normal pitch   Mood Dysphoric, anxious   Affect Mood congruent   Thought Processes logical   Associations intact associations   Thought Content normal   Perceptual Disturbances: none   Abnormal Thoughts  Risk Potential Suicidal ideation - None  Homicidal ideation - None  Potential for aggression - No   Orientation oriented to person, place, time/date and situation   Memory recent and remote memory grossly intact   Cosciousness alert and awake   Attention Span attention span and concentration are age appropriate   Intellect Appears to be of Average Intelligence   Insight age appropriate    Judgement good    Muscle Strength and  Gait muscle strength and tone were normal   Language no difficulty naming common objects   Fund of Knowledge displays adequate knowledge of current events   Pain none   Pain Scale 0        Laboratory Results: No results found for this or any previous visit.    Assessment/Plan:         Assessment & Plan  Moderate episode of recurrent major depressive disorder (HCC)  Continue with titration of Cymbalta will increase to 40 mg daily.  Patient has restarted psychotherapy.    Orders:    busPIRone (BUSPAR) 10 mg tablet; Take 1 tablet (10 mg total) by mouth 2 (two) times a day    DULoxetine 40 MG CPEP; Take 1 capsule (40 mg total) by mouth daily    MARIA M (generalized anxiety disorder)  See MDD    Orders:    busPIRone (BUSPAR) 10 mg tablet; Take 1 tablet (10 mg total) by mouth 2 (two) times a day    DULoxetine 40 MG CPEP; Take 1 capsule (40 mg total) by mouth daily         Treatment Recommendations- Risks Benefits          Will continue to titrate Cymbalta.  Will increase to 40 mg daily for depression and anxiety.    We did discuss signs and symptoms of serotonin syndrome given current regimen.    Continue the  following medication:       Buspar 10 mg BID for anxiety    We will follow up in 6 weeks or sooner if questions or concerns arise  She is aware of emergent vs non-emergent mental health resources  She is able to contract for safety at this time     Risks, Benefits And Possible Side Effects Of Medications:  discussed    Controlled Medication Discussion: not applicable     Psychotherapy Provided: no    Treatment Plan:    Completed and signed during the session: Not applicable - Treatment Plan to be completed by [facility] therapist    Visit start time: 8:00 AM  Visit end time: 8:20 AM  I spent 20 minutes with the patient during this visit.    This note was completed in part utilizing Dragon dictation Software. Grammatical, translation, syntax errors, random word insertions, spelling mistakes, and incomplete sentences may be an occasional consequence of this system secondary to software limitations with voice recognition, ambient noise, and hardware issues. If you have any questions or concerns about the content, text, or information contained within the body of this dictation, please contact the provider for clarification.     Sofy Todd PA-C 10/28/24

## 2024-10-28 NOTE — ASSESSMENT & PLAN NOTE
Continue with titration of Cymbalta will increase to 40 mg daily.  Patient has restarted psychotherapy.

## 2024-11-01 ENCOUNTER — TELEMEDICINE (OUTPATIENT)
Dept: BEHAVIORAL/MENTAL HEALTH CLINIC | Facility: CLINIC | Age: 19
End: 2024-11-01
Payer: COMMERCIAL

## 2024-11-01 DIAGNOSIS — F33.1 MAJOR DEPRESSIVE DISORDER, RECURRENT, MODERATE (HCC): Primary | ICD-10-CM

## 2024-11-01 PROCEDURE — 90834 PSYTX W PT 45 MINUTES: CPT | Performed by: SOCIAL WORKER

## 2024-11-05 NOTE — PSYCH
Virtual Regular Visit    Verification of patient location:    Patient is located at Home in the following state in which I hold an active license [place]      Assessment/Plan:    Problem List Items Addressed This Visit    None  Visit Diagnoses       Major depressive disorder, recurrent, moderate (HCC)    -  Primary            Goals addressed in session: Goal 1          Reason for visit is No chief complaint on file.       Encounter provider Stephanie Cerna LCSW      Recent Visits  Date Type Provider Dept   11/01/24 Telemedicine Stephanie Cerna LCSW [facility] Therapist [facility]   Showing recent visits within past 7 days and meeting all other requirements  Future Appointments  No visits were found meeting these conditions.  Showing future appointments within next 150 days and meeting all other requirements       The patient was identified by name and date of birth. Rylee Jones was informed that this is a telemedicine visit and that the visit is being conducted throughthe Epic Embedded platform. She agrees to proceed..  My office door was closed. No one else was in the room.  She acknowledged consent and understanding of privacy and security of the video platform. The patient has agreed to participate and understands they can discontinue the visit at any time.    Patient is aware this is a billable service.     Subjective  Rylee Jones is a 19 y.o. female  .      HPI     Past Medical History:   Diagnosis Date    Closed fracture of left proximal humerus 1/16/2018    Patient denies medical problems        Past Surgical History:   Procedure Laterality Date    [place] DIAGNOSTIC ARTHROSCOPY SHOULDER +- SYNOVIAL BX Left 4/12/2018    Procedure: ARTHROSCOPY SHOULDER, DIAGNOSTIC;  Surgeon: Scar Jamil MD;  Location: [facility];  Service: Orthopedics       Current Outpatient Medications   Medication Sig Dispense Refill    buPROPion (WELLBUTRIN XL) 150 mg 24 hr tablet Take 1 tablet (150 mg total) by mouth  "every morning for 7 days 7 tablet 0    busPIRone (BUSPAR) 10 mg tablet Take 1 tablet (10 mg total) by mouth 2 (two) times a day 180 tablet 1    DULoxetine 40 MG CPEP Take 1 capsule (40 mg total) by mouth daily 30 capsule 1    spironolactone (ALDACTONE) 50 mg tablet Take 50 mg by mouth daily       No current facility-administered medications for this visit.        No Known Allergies    Review of Systems    Video Exam    There were no vitals filed for this visit.    Physical Exam     Visit Time    Visit Start Time: 15:00   Visit Stop Time: 15:50  Total Visit Duration:  50 minutes          Behavioral Health Psychotherapy Progress Note    Psychotherapy Provided: Individual Psychotherapy     1. Major depressive disorder, recurrent, moderate (HCC)            Goals addressed in session: Goal 1     DATA: Rylee reports feeling a bit depressed since our last session. She also admitted to restricting calories, purging a couple of times, and minor cutting behaviors. She reports understanding that all of these behaviors are indicators that she is trying to work through overwhelming negative feelings. She reports talking about this with her medication provider and wanting to share it in therapy because she wants to understand and more effectively deal with her emotional pain. Walked through recent encounters with her mom wherein she connected with feelings of disgust and sadness. She explained how she is deeply disappointed by the person who she has always been despite Rylee's romanticism of her being the \"perfect parent\". Validated this pain for her and assisted in her holding it. Worked on identifying a safe place imagery and pairing it with deep breathing. Also introduced grounding techniques such as holding ice cubes and pushing the palms of her hands together to help work through functional freeze. She will practice these skills as well as try and connect with an on campus group and/or nutritionist specific for her eating " "concerns. She will reach out if her symptoms worsen. Return in 2 weeks or sooner if needed.   During this session, this clinician used the following therapeutic modalities: Cognitive Processing Therapy and Mindfulness-based Strategies    Substance Abuse was not addressed during this session. If the client is diagnosed with a co-occurring substance use disorder, please indicate any changes in the frequency or amount of use: NA. Stage of change for addressing substance use diagnoses: No substance use/Not applicable    ASSESSMENT:  Rylee Jones presents with a Dysthymic mood.     her affect is Normal range and intensity, which is congruent, with her mood and the content of the session. The client has made progress on their goals.    During this session the client was oriented to person, place, and time. The client was engaged in the session. The client was able to sustain direct eye contact and was without psychomotor agitation. The client's thought processes were linear and clear.   Rylee Jones presents with a none risk of suicide, minimal risk of self-harm, and none risk of harm to others.    For any risk assessment that surpasses a \"low\" rating, a safety plan must be developed.    A safety plan was indicated: yes  If yes, describe in detail Rylee was taught grounding skills to help work through functional freeze. She will look into on campus group therapy and a nutritionist if possible. She will call if her symptoms worsen.     PLAN: Between sessions, Rylee Jones will take medication as prescribed and practice positive coping strategies as needed . At the next session, the therapist will use Cognitive Processing Therapy and Mindfulness-based Strategies to address stressors.    Behavioral Health Treatment Plan and Discharge Planning: Rylee Jones is aware of and agrees to continue to work on their treatment plan. They have identified and are working toward their discharge goals. yes    Visit start and stop " times:    11/05/24  Start Time: 1500  Stop Time: 1550  Total Visit Time: 50 minutes

## 2024-11-15 ENCOUNTER — TELEMEDICINE (OUTPATIENT)
Dept: BEHAVIORAL/MENTAL HEALTH CLINIC | Facility: CLINIC | Age: 19
End: 2024-11-15
Payer: COMMERCIAL

## 2024-11-15 DIAGNOSIS — F33.1 MAJOR DEPRESSIVE DISORDER, RECURRENT, MODERATE (HCC): Primary | ICD-10-CM

## 2024-11-15 PROCEDURE — 90834 PSYTX W PT 45 MINUTES: CPT | Performed by: SOCIAL WORKER

## 2024-11-20 NOTE — PSYCH
Virtual Regular Visit    Verification of patient location:    Patient is located at Home in the following state in which I hold an active license [place]      Assessment/Plan:    Problem List Items Addressed This Visit    None  Visit Diagnoses         Major depressive disorder, recurrent, moderate (HCC)    -  Primary            Goals addressed in session: Goal 1          Reason for visit is No chief complaint on file.       Encounter provider Stephanie Cerna LCSW      Recent Visits  Date Type Provider Dept   11/15/24 Telemedicine Stephanie Cerna LCSW [facility] Therapist [facility]   Showing recent visits within past 7 days and meeting all other requirements  Future Appointments  No visits were found meeting these conditions.  Showing future appointments within next 150 days and meeting all other requirements       The patient was identified by name and date of birth. Rylee Jones was informed that this is a telemedicine visit and that the visit is being conducted throughthe Epic Embedded platform. She agrees to proceed..  My office door was closed. No one else was in the room.  She acknowledged consent and understanding of privacy and security of the video platform. The patient has agreed to participate and understands they can discontinue the visit at any time.    Patient is aware this is a billable service.     Subjective  Rylee Jones is a 19 y.o. female  .      HPI     Past Medical History:   Diagnosis Date    Closed fracture of left proximal humerus 1/16/2018    Patient denies medical problems        Past Surgical History:   Procedure Laterality Date    [place] DIAGNOSTIC ARTHROSCOPY SHOULDER +- SYNOVIAL BX Left 4/12/2018    Procedure: ARTHROSCOPY SHOULDER, DIAGNOSTIC;  Surgeon: Scar Jamil MD;  Location: [facility];  Service: Orthopedics       Current Outpatient Medications   Medication Sig Dispense Refill    buPROPion (WELLBUTRIN XL) 150 mg 24 hr tablet Take 1 tablet (150 mg total) by mouth  every morning for 7 days 7 tablet 0    busPIRone (BUSPAR) 10 mg tablet Take 1 tablet (10 mg total) by mouth 2 (two) times a day 180 tablet 1    DULoxetine 40 MG CPEP Take 1 capsule (40 mg total) by mouth daily 30 capsule 1    spironolactone (ALDACTONE) 50 mg tablet Take 50 mg by mouth daily       No current facility-administered medications for this visit.        No Known Allergies    Review of Systems    Video Exam    There were no vitals filed for this visit.    Physical Exam     Visit Time    Visit Start Time: 15:00  Visit Stop Time: 15:50  Total Visit Duration:  50 minutes              Behavioral Health Psychotherapy Progress Note    Psychotherapy Provided: Individual Psychotherapy     1. Major depressive disorder, recurrent, moderate (HCC)            Goals addressed in session: Goal 1     DATA: Rylee reports feeling well stating that she has been working on mindfulness and emotional release techniques as was discussed during our last session. Although she has not connected with a dietician on campus she intends to in the next 2 weeks. She will also look into group therapy options on campus as well. Discussed current stressors with family and dating as well as affirmed boundaries and standards that she is not willing to budge on, as per her report. Will utilize TIP skills when feeling anxious or have urges to purge.   During this session, this clinician used the following therapeutic modalities: Cognitive Processing Therapy, Dialectical Behavior Therapy, and Mindfulness-based Strategies    Substance Abuse was not addressed during this session. If the client is diagnosed with a co-occurring substance use disorder, please indicate any changes in the frequency or amount of use: NA. Stage of change for addressing substance use diagnoses: No substance use/Not applicable    ASSESSMENT:  Rylee Jones presents with a Euthymic/ normal mood.     her affect is Normal range and intensity, which is congruent, with her mood  "and the content of the session. The client has made progress on their goals.    During this session the client was oriented to person, place, and time. The client was engaged in the session. The client was able to sustain direct eye contact and was without psychomotor agitation. The client's thought processes were linear and clear.   Rylee Jones presents with a none risk of suicide, none risk of self-harm, and none risk of harm to others.    For any risk assessment that surpasses a \"low\" rating, a safety plan must be developed.    A safety plan was indicated: no  If yes, describe in detail NA    PLAN: Between sessions, Rylee Jones will take medication as prescribed and practice positive coping strategies as needed . At the next session, the therapist will use Cognitive Behavioral Therapy, Dialectical Behavior Therapy, and Mindfulness-based Strategies to address stressors.    Behavioral Health Treatment Plan and Discharge Planning: Rylee Jones is aware of and agrees to continue to work on their treatment plan. They have identified and are working toward their discharge goals. yes    Visit start and stop times:    11/20/24  Start Time: 1500  Stop Time: 1550  Total Visit Time: 50 minutes  "

## 2024-11-26 DIAGNOSIS — F33.1 MODERATE EPISODE OF RECURRENT MAJOR DEPRESSIVE DISORDER (HCC): ICD-10-CM

## 2024-11-26 DIAGNOSIS — F41.1 GAD (GENERALIZED ANXIETY DISORDER): ICD-10-CM

## 2024-11-26 RX ORDER — DULOXETINE 40 MG/1
40 CAPSULE, DELAYED RELEASE ORAL DAILY
Qty: 90 CAPSULE | Refills: 1 | Status: SHIPPED | OUTPATIENT
Start: 2024-11-26 | End: 2025-01-25

## 2024-11-29 ENCOUNTER — TELEMEDICINE (OUTPATIENT)
Dept: BEHAVIORAL/MENTAL HEALTH CLINIC | Facility: CLINIC | Age: 19
End: 2024-11-29
Payer: COMMERCIAL

## 2024-11-29 DIAGNOSIS — F33.1 MAJOR DEPRESSIVE DISORDER, RECURRENT, MODERATE (HCC): Primary | ICD-10-CM

## 2024-11-29 DIAGNOSIS — F41.1 GENERALIZED ANXIETY DISORDER: ICD-10-CM

## 2024-11-29 PROCEDURE — 90834 PSYTX W PT 45 MINUTES: CPT | Performed by: SOCIAL WORKER

## 2024-12-01 NOTE — PSYCH
Virtual Regular Visit    Verification of patient location:    Patient is located at Home in the following state in which I hold an active license [place]      Assessment/Plan:    Problem List Items Addressed This Visit    None  Visit Diagnoses         Major depressive disorder, recurrent, moderate (HCC)    -  Primary      Generalized anxiety disorder                Goals addressed in session: Goal 1          Reason for visit is No chief complaint on file.       Encounter provider Stephanie Cerna LCSW      Recent Visits  Date Type Provider Dept   11/29/24 Telemedicine Stephanie Cerna LCSW [facility] Therapist [facility]   Showing recent visits within past 7 days and meeting all other requirements  Future Appointments  No visits were found meeting these conditions.  Showing future appointments within next 150 days and meeting all other requirements       The patient was identified by name and date of birth. Rylee Jones was informed that this is a telemedicine visit and that the visit is being conducted throughthe Epic Embedded platform. She agrees to proceed..  My office door was closed. No one else was in the room.  She acknowledged consent and understanding of privacy and security of the video platform. The patient has agreed to participate and understands they can discontinue the visit at any time.    Patient is aware this is a billable service.     Subjective  Rylee Jones is a 19 y.o. female  .      HPI     Past Medical History:   Diagnosis Date    Closed fracture of left proximal humerus 1/16/2018    Patient denies medical problems        Past Surgical History:   Procedure Laterality Date    [place] DIAGNOSTIC ARTHROSCOPY SHOULDER +- SYNOVIAL BX Left 4/12/2018    Procedure: ARTHROSCOPY SHOULDER, DIAGNOSTIC;  Surgeon: Scar Jamil MD;  Location: [facility];  Service: Orthopedics       Current Outpatient Medications   Medication Sig Dispense Refill    buPROPion (WELLBUTRIN XL) 150 mg 24 hr tablet  Take 1 tablet (150 mg total) by mouth every morning for 7 days 7 tablet 0    busPIRone (BUSPAR) 10 mg tablet Take 1 tablet (10 mg total) by mouth 2 (two) times a day 180 tablet 1    DULoxetine HCl 40 MG CPEP TAKE 1 CAPSULE (40 MG TOTAL) BY MOUTH DAILY. 90 capsule 1    spironolactone (ALDACTONE) 50 mg tablet Take 50 mg by mouth daily       No current facility-administered medications for this visit.        No Known Allergies    Review of Systems    Video Exam    There were no vitals filed for this visit.    Physical Exam     Visit Time    Visit Start Time: 15:00  Visit Stop Time: 15;50  Total Visit Duration:  50 minutes      Behavioral Health Psychotherapy Progress Note    Psychotherapy Provided: Individual Psychotherapy     1. Major depressive disorder, recurrent, moderate (HCC)        2. Generalized anxiety disorder            Goals addressed in session: Goal 1     DATA: Rylee reports feeling ok stating that she had a nice Thanksgiving with her dad, although he was very sad because his dad recently . Talked through what this is like for her to watch him suffer and not know how to help. Acknowledged the difference between concrete and emotional needs and how she can best help him right now (bringing him dinner, offering to do laundry vs talking about how he feels). She was receptive to this. Respectively, we also talked about ways to increase motivation within herself. Examined root causes of this and ruled out sleep issues or increased depression. Did endorse more marijuana use however to which writer educated on how THC remains in the system for a lot longer and can cause loss of motivation. She was receptive to this. No new cuting behaviors or eating concerns. Has been working on staying in the moment and listening to her fullness clues. No new symptoms or safety concerns. Return in 2 weeks or sooner if needed.   During this session, this clinician used the following therapeutic modalities: Cognitive  "Behavioral Therapy, Cognitive Processing Therapy, and Mindfulness-based Strategies    Substance Abuse was not addressed during this session. If the client is diagnosed with a co-occurring substance use disorder, please indicate any changes in the frequency or amount of use: NA. Stage of change for addressing substance use diagnoses: No substance use/Not applicable    ASSESSMENT:  Rylee Jones presents with a Euthymic/ normal mood.     her affect is Normal range and intensity, which is congruent, with her mood and the content of the session. The client has made progress on their goals.    During this session the client was oriented to person, place, and time. The client was engaged in the session. The client was able to sustain direct eye contact and was without psychomotor agitation. The client's thought processes were linear and clear.   Rylee Jones presents with a none risk of suicide, none risk of self-harm, and none risk of harm to others.    For any risk assessment that surpasses a \"low\" rating, a safety plan must be developed.    A safety plan was indicated: no  If yes, describe in detail NA    PLAN: Between sessions, Rylee Jones will take medication as prescribed and practice positive coping strategies as needed . At the next session, the therapist will use Client-centered Therapy, Cognitive Behavioral Therapy, Cognitive Processing Therapy, and Mindfulness-based Strategies to address stressors.    Behavioral Health Treatment Plan and Discharge Planning: Rylee Jones is aware of and agrees to continue to work on their treatment plan. They have identified and are working toward their discharge goals. yes    Visit start and stop times:    12/01/24  Start Time: 1500  Stop Time: 1550  Total Visit Time: 50 minutes    "

## 2024-12-09 ENCOUNTER — TELEMEDICINE (OUTPATIENT)
Dept: PSYCHIATRY | Facility: CLINIC | Age: 19
End: 2024-12-09
Payer: COMMERCIAL

## 2024-12-09 DIAGNOSIS — F41.1 GAD (GENERALIZED ANXIETY DISORDER): ICD-10-CM

## 2024-12-09 DIAGNOSIS — F33.1 MODERATE EPISODE OF RECURRENT MAJOR DEPRESSIVE DISORDER (HCC): Primary | ICD-10-CM

## 2024-12-09 PROCEDURE — 99214 OFFICE O/P EST MOD 30 MIN: CPT | Performed by: PHYSICIAN ASSISTANT

## 2024-12-09 NOTE — ASSESSMENT & PLAN NOTE
Continue Cymbalta 40 mg daily for depression and anxiety  Continue BuSpar 10 mg twice daily for anxiety  Continue psychotherapy to develop coping skills and a safety plan

## 2024-12-09 NOTE — PSYCH
This note was not shared with the patient due to reasonable likelihood of causing patient harm     Virtual Regular Visit    Problem List Items Addressed This Visit       Moderate episode of recurrent major depressive disorder (HCC) - Primary    Continue Cymbalta 40 mg daily for depression and anxiety  Continue BuSpar 10 mg twice daily for anxiety  Continue psychotherapy to develop coping skills and a safety plan         MARIA M (generalized anxiety disorder)    See MDD          Reason for visit is   Chief Complaint   Patient presents with    Medication Management    Follow-up     Encounter provider Sofy Todd PA-C    Provider located at [address]  [facility] [address] [phone]    Recent Visits  No visits were found meeting these conditions.  Showing recent visits within past 7 days and meeting all other requirements  Today's Visits  Date Type Provider Dept   12/09/24 Telemedicine Sofy Todd PA-C  Psychiatric [facility]   Showing today's visits and meeting all other requirements  Future Appointments  No visits were found meeting these conditions.  Showing future appointments within next 150 days and meeting all other requirements       After connecting through [employer], the patient was identified by name and date of birth. Rylee Jones was informed that this is a telemedicine visit and that the visit is being conducted through the Epic Embedded platform. She agrees to proceed. which may not be secure and therefore, might not be HIPAA-compliant.  My office door was closed. No one else was in the room.  She acknowledged consent and understanding of privacy and security of the video platform. The patient has agreed to participate and understands they can discontinue the visit at any time.    SUBJECTIVE:    Rylee Jones is a 19 y.o. female with a history of depression and anxiety who presents for  "virtual follow-up today.  Today she reports I am feeling better.\"  She describes her mood as \"pretty good since we upped the Cymbalta.\"  She denies any emotional numbing which she has experienced with antidepressants in the past.  She does share that her anxiety has been increased but she is managing this well with coping skills.  She is in her finals week for the semester and has a lot of papers to complete.  She has been sleeping adequately.  She did meet with a dietitian on campus who gave her some recommendations about getting adequate nutrition in.  She feels that therapy has been beneficial for her overall mood.    Though she has been consistent with Cymbalta she is only taking 1 BuSpar in the morning.  She often forgets the second dose.  She will attempt to stay consistent with the second dose so we can monitor the efficacy of BuSpar.    She denies any recent self-harm urges or engaging in any harm.    No suicidal or homicidal thought, plan, or intent.    No auditory or visual hallucinations.  No delusions.    HPI ROS Appetite Changes and Sleep: normal appetite, normal energy level, and normal number of sleep hours    Review Of Systems:     Constitutional Negative   ENT Negative   Cardiovascular Negative   Respiratory Negative   Gastrointestinal Negative   Genitourinary Negative   Musculoskeletal Negative   Integumentary Negative   Neurological Negative   Endocrine Negative   Other Symptoms Negative and None        Substance Abuse History:    Social History     Substance and Sexual Activity   Drug Use No       Family Psychiatric History:     Family History   Problem Relation Age of Onset    No Known Problems Mother     No Known Problems Father        Social History     Socioeconomic History    Marital status: Single     Spouse name: Not on file    Number of children: Not on file    Years of education: Not on file    Highest education level: Not on file   Occupational History    Not on file   Tobacco Use    " Smoking status: Never    Smokeless tobacco: Never   Substance and Sexual Activity    Alcohol use: No    Drug use: No    Sexual activity: Not on file   Other Topics Concern    Not on file   Social History Narrative    Not on file     Social Drivers of Health     Financial Resource Strain: Not on file   Food Insecurity: Not on file   Transportation Needs: Not on file   Physical Activity: Not on file   Stress: Not on file   Social Connections: Not on file   Intimate Partner Violence: Not on file   Housing Stability: Not on file       Past Medical History:   Diagnosis Date    Closed fracture of left proximal humerus 1/16/2018    Patient denies medical problems        Past Surgical History:   Procedure Laterality Date    [place] DIAGNOSTIC ARTHROSCOPY SHOULDER +- SYNOVIAL BX Left 4/12/2018    Procedure: ARTHROSCOPY SHOULDER, DIAGNOSTIC;  Surgeon: Scar Jamil MD;  Location: [facility];  Service: Orthopedics       Current Outpatient Medications   Medication Sig Dispense Refill    busPIRone (BUSPAR) 10 mg tablet Take 1 tablet (10 mg total) by mouth 2 (two) times a day 180 tablet 1    DULoxetine HCl 40 MG CPEP TAKE 1 CAPSULE (40 MG TOTAL) BY MOUTH DAILY. 90 capsule 1    spironolactone (ALDACTONE) 50 mg tablet Take 50 mg by mouth daily       No current facility-administered medications for this visit.        No Known Allergies    The following portions of the patient's history were reviewed and updated as appropriate: allergies, current medications, past family history, past medical history, past social history, past surgical history, and problem list.    OBJECTIVE:     Mental Status Examination:    Appearance age appropriate, casually dressed   Behavior Pleasant and cooperative   Speech normal volume, normal pitch   Mood Euthymic, slightly anxious at times   Affect Mood congruent   Thought Processes logical   Associations intact associations   Thought Content normal   Perceptual Disturbances: none   Abnormal Thoughts  Risk  Potential Suicidal ideation - None  Homicidal ideation - None  Potential for aggression - No   Orientation oriented to person, place, time/date and situation   Memory recent and remote memory grossly intact   Cosciousness alert and awake   Attention Span attention span and concentration are age appropriate   Intellect Appears to be of Average Intelligence   Insight age appropriate    Judgement good    Muscle Strength and  Gait Not assessed due to virtual visit   Language no difficulty naming common objects   Fund of Knowledge displays adequate knowledge of current events   Pain none   Pain Scale 0        Laboratory Results: No results found for this or any previous visit.    Assessment/Plan:         Assessment & Plan  Moderate episode of recurrent major depressive disorder (HCC)  Continue Cymbalta 40 mg daily for depression and anxiety  Continue BuSpar 10 mg twice daily for anxiety  Continue psychotherapy to develop coping skills and a safety plan         MARIA M (generalized anxiety disorder)  See MDD              Treatment Recommendations- Risks Benefits            We did discuss signs and symptoms of serotonin syndrome given current regimen.    Continue the following medication:     Cymbalta 40 mg daily for depression and anxiety  Buspar 10 mg BID for anxiety    We will follow up in 8 weeks or sooner if questions or concerns arise  She is aware of emergent vs non-emergent mental health resources  She is able to contract for safety at this time     Risks, Benefits And Possible Side Effects Of Medications:  discussed    Controlled Medication Discussion: not applicable     Psychotherapy Provided: no    Treatment Plan:    Completed and signed during the session: Not applicable - Treatment Plan to be completed by [facility] therapist    Visit start time: 8:00 AM  Visit end time: 8:18 AM  I spent 18 minutes with the patient during this visit.    This note was completed in part utilizing Dragon  dictation Software. Grammatical, translation, syntax errors, random word insertions, spelling mistakes, and incomplete sentences may be an occasional consequence of this system secondary to software limitations with voice recognition, ambient noise, and hardware issues. If you have any questions or concerns about the content, text, or information contained within the body of this dictation, please contact the provider for clarification.     Sofy Todd PA-C 12/09/24

## 2024-12-13 ENCOUNTER — TELEMEDICINE (OUTPATIENT)
Dept: BEHAVIORAL/MENTAL HEALTH CLINIC | Facility: CLINIC | Age: 19
End: 2024-12-13
Payer: COMMERCIAL

## 2024-12-13 DIAGNOSIS — F33.1 MAJOR DEPRESSIVE DISORDER, RECURRENT, MODERATE (HCC): Primary | ICD-10-CM

## 2024-12-13 PROCEDURE — 90834 PSYTX W PT 45 MINUTES: CPT | Performed by: SOCIAL WORKER

## 2024-12-17 NOTE — PSYCH
Virtual Regular Visit    Verification of patient location:    Patient is located at Home in the following state in which I hold an active license [place]      Assessment/Plan:    Problem List Items Addressed This Visit    None  Visit Diagnoses         Major depressive disorder, recurrent, moderate (HCC)    -  Primary            Goals addressed in session: Goal 1     Depression Follow-up Plan Completed: Not applicable    Reason for visit is No chief complaint on file.       Encounter provider Stephanie Cerna LCSW      Recent Visits  Date Type Provider Dept   12/13/24 Telemedicine Stephanie Cerna LCSW [facility] Therapist [facility]   Showing recent visits within past 7 days and meeting all other requirements  Future Appointments  No visits were found meeting these conditions.  Showing future appointments within next 150 days and meeting all other requirements       The patient was identified by name and date of birth. Rylee Jones was informed that this is a telemedicine visit and that the visit is being conducted throughthe Epic Embedded platform. She agrees to proceed..  My office door was closed. No one else was in the room.  She acknowledged consent and understanding of privacy and security of the video platform. The patient has agreed to participate and understands they can discontinue the visit at any time.    Patient is aware this is a billable service.     Subjective  Rylee Jones is a 19 y.o. female  .      HPI     Past Medical History:   Diagnosis Date    Closed fracture of left proximal humerus 1/16/2018    Patient denies medical problems        Past Surgical History:   Procedure Laterality Date    [place] DIAGNOSTIC ARTHROSCOPY SHOULDER +- SYNOVIAL BX Left 4/12/2018    Procedure: ARTHROSCOPY SHOULDER, DIAGNOSTIC;  Surgeon: Scar Jamil MD;  Location: [facility];  Service: Orthopedics       Current Outpatient Medications   Medication Sig Dispense Refill    busPIRone (BUSPAR) 10 mg tablet Take  1 tablet (10 mg total) by mouth 2 (two) times a day 180 tablet 1    DULoxetine HCl 40 MG CPEP TAKE 1 CAPSULE (40 MG TOTAL) BY MOUTH DAILY. 90 capsule 1    spironolactone (ALDACTONE) 50 mg tablet Take 50 mg by mouth daily       No current facility-administered medications for this visit.        No Known Allergies    Review of Systems    Video Exam    There were no vitals filed for this visit.    Physical Exam     Visit Time    Visit Start Time: 15:00  Visit Stop Time: 15:50  Total Visit Duration:  50 minutes          Behavioral Health Psychotherapy Progress Note    Psychotherapy Provided: Individual Psychotherapy     1. Major depressive disorder, recurrent, moderate (HCC)            Goals addressed in session: Goal 1     DATA: Rylee reports feeling well overall. She reports spending time with friends and re-engaging with hobbies such as art and writing. Discussed revelations within her family system and how she is moving closer to acceptance with her mom. Reviewed emotional release techniques to help move through anger and sadness. No new symptoms or safety concerns. Has been focusing on listening to fullness cues and practicing mindful eating. Reports improved mood overall and is looking forward to the winter recess.   During this session, this clinician used the following therapeutic modalities: Client-centered Therapy    Substance Abuse was not addressed during this session. If the client is diagnosed with a co-occurring substance use disorder, please indicate any changes in the frequency or amount of use: NA. Stage of change for addressing substance use diagnoses: No substance use/Not applicable    ASSESSMENT:  Rylee Jones presents with a Euthymic/ normal mood.     her affect is Normal range and intensity, which is congruent, with her mood and the content of the session. The client has made progress on their goals.    During this session the client was oriented to person, place, and time. The client was engaged  "in the session. The client was able to sustain direct eye contact and was without psychomotor agitation. The client's thought processes were linear and clear.   Rylee Jones presents with a none risk of suicide, none risk of self-harm, and none risk of harm to others.    For any risk assessment that surpasses a \"low\" rating, a safety plan must be developed.    A safety plan was indicated: no  If yes, describe in detail NA    PLAN: Between sessions, Rylee Jones will take medication as prescribed and practice positive coping strategies as needed . At the next session, the therapist will use Client-centered Therapy to address stressors.    Behavioral Health Treatment Plan and Discharge Planning: Rylee Jones is aware of and agrees to continue to work on their treatment plan. They have identified and are working toward their discharge goals. yes    Depression Follow-up Plan Completed: Not applicable    Visit start and stop times:    12/17/24  Start Time: 1500  Stop Time: 1550  Total Visit Time: 50 minutes  "

## 2024-12-27 ENCOUNTER — TELEPHONE (OUTPATIENT)
Age: 19
End: 2024-12-27

## 2024-12-27 DIAGNOSIS — F33.1 MODERATE EPISODE OF RECURRENT MAJOR DEPRESSIVE DISORDER (HCC): ICD-10-CM

## 2024-12-27 DIAGNOSIS — F41.1 GAD (GENERALIZED ANXIETY DISORDER): ICD-10-CM

## 2024-12-27 RX ORDER — DULOXETINE 40 MG/1
40 CAPSULE, DELAYED RELEASE ORAL DAILY
Qty: 30 CAPSULE | Refills: 0 | Status: SHIPPED | OUTPATIENT
Start: 2024-12-27 | End: 2025-01-26

## 2024-12-27 NOTE — TELEPHONE ENCOUNTER
Patient is requesting the last refill from 11.26.24, be resent to the following pharmacy:    [facility]/pharmacy #[id number] - [place], [place] - [address]     Patient stated they are unable to  at the pharmacy the script was originally sent to.

## 2024-12-27 NOTE — TELEPHONE ENCOUNTER
Last refill for 11.26.24 was for the following medication:    DULoxetine HCl 40 MG CPEP     90 day refill request was asked to be reviewed on 11.26.24-Medication management team was unable to approve or deny.     Last dispense date: 11.26.24    Per the patient-they are unable to  the medication at the pharmacy it was originally sent to.      Please resend script to:  [facility]/pharmacy #[id number] - [place], [place] - [address]       Please review, thank you.

## 2025-01-10 ENCOUNTER — TELEPHONE (OUTPATIENT)
Age: 20
End: 2025-01-10

## 2025-01-10 NOTE — TELEPHONE ENCOUNTER
Patient is calling regarding rescheduling an appointment.    Date/Time: 1.10.25 @ 3:00pm    Reason: Fell/has Dr. Cruz.    Patient was rescheduled: YES [x] NO []  If yes, when was Patient reschedule for: 1.24.25 @ 8:00am    Patient requesting call back to reschedule: YES [] NO [x]

## 2025-02-07 ENCOUNTER — OFFICE VISIT (OUTPATIENT)
Dept: OBGYN CLINIC | Facility: CLINIC | Age: 20
End: 2025-02-07
Payer: COMMERCIAL

## 2025-02-07 ENCOUNTER — APPOINTMENT (OUTPATIENT)
Dept: RADIOLOGY | Facility: CLINIC | Age: 20
End: 2025-02-07
Payer: COMMERCIAL

## 2025-02-07 DIAGNOSIS — M25.561 RIGHT KNEE PAIN, UNSPECIFIED CHRONICITY: ICD-10-CM

## 2025-02-07 DIAGNOSIS — M23.91 ACUTE INTERNAL DERANGEMENT OF RIGHT KNEE: Primary | ICD-10-CM

## 2025-02-07 PROCEDURE — 73562 X-RAY EXAM OF KNEE 3: CPT

## 2025-02-07 PROCEDURE — 99203 OFFICE O/P NEW LOW 30 MIN: CPT | Performed by: ORTHOPAEDIC SURGERY

## 2025-02-07 PROCEDURE — 73560 X-RAY EXAM OF KNEE 1 OR 2: CPT

## 2025-02-07 NOTE — PROGRESS NOTES
Assessment/Plan:  1. Acute internal derangement of right knee  XR knee 3 vw right non injury    XR knee 1 or 2 vw left    MRI knee right  wo contrast          Rylee has right-sided knee pain after a traumatic injury and laxity on examination today concerning for potential ACL injury.  I have recommended MRI of the right knee for further evaluation to assess for internal knee injury.  She was agreeable to this plan and will undergo MRI and follow-up in the office after the MRI has been completed.  She was counseled on avoiding exercise or any high risk physical activity until the MRI can be completed.    Subjective:   Rylee Jones is a 19 y.o. female who presents to the office for evaluation for right-sided knee pain.  She had an injury to her right knee 3 weeks ago when she fell landing directly on a train track.  She felt severely increased discomfort and swelling in the right knee and difficulty walking.  She has noticed improvement in the swelling but the pain still persists in the right knee and she feels like the knee is not correct.  She denies any history of knee issue in the past.  She feels like the knee may be unstable.  She denies any locking or catching in the knee.      Review of Systems   Constitutional:  Negative for chills, fever and unexpected weight change.   HENT:  Negative for hearing loss, nosebleeds and sore throat.    Eyes:  Negative for pain, redness and visual disturbance.   Respiratory:  Negative for cough, shortness of breath and wheezing.    Cardiovascular:  Negative for chest pain, palpitations and leg swelling.   Gastrointestinal:  Negative for abdominal pain, nausea and vomiting.   Endocrine: Negative for polydipsia and polyuria.   Genitourinary:  Negative for dysuria and hematuria.   Musculoskeletal:         See HPI   Skin:  Negative for rash and wound.   Neurological:  Negative for dizziness, numbness and headaches.   Psychiatric/Behavioral:  Negative for decreased concentration and  suicidal ideas. The patient is not nervous/anxious.          Past Medical History:   Diagnosis Date    Closed fracture of left proximal humerus 1/16/2018    Patient denies medical problems        Past Surgical History:   Procedure Laterality Date    [place] DIAGNOSTIC ARTHROSCOPY SHOULDER +- SYNOVIAL BX Left 4/12/2018    Procedure: ARTHROSCOPY SHOULDER, DIAGNOSTIC;  Surgeon: Scar Jamil MD;  Location: [facility];  Service: Orthopedics       Family History   Problem Relation Age of Onset    No Known Problems Mother     No Known Problems Father        Social History     Occupational History    Not on file   Tobacco Use    Smoking status: Never    Smokeless tobacco: Never   Substance and Sexual Activity    Alcohol use: No    Drug use: No    Sexual activity: Not on file         Current Outpatient Medications:     DULoxetine HCl 40 MG CPEP, Take 1 capsule (40 mg total) by mouth daily, Disp: 30 capsule, Rfl: 0    busPIRone (BUSPAR) 10 mg tablet, Take 1 tablet (10 mg total) by mouth 2 (two) times a day, Disp: 180 tablet, Rfl: 1    spironolactone (ALDACTONE) 50 mg tablet, Take 50 mg by mouth daily, Disp: , Rfl:     No Known Allergies    Objective:  There were no vitals filed for this visit.  Pain Score:   4      Right Knee Exam     Tenderness   The patient is experiencing no tenderness.     Range of Motion   Extension:  -5 abnormal   Flexion:  140 abnormal     Tests   Lachman:  Anterior - positive      Drawer:  Anterior - positive    Posterior - positive    Other   Erythema: absent  Sensation: normal  Pulse: present  Swelling: none  Effusion: no effusion present          Observations     Right Knee   Negative for effusion.       Physical Exam  Vitals and nursing note reviewed.   Constitutional:       Appearance: Normal appearance. She is well-developed.   HENT:      Head: Normocephalic and atraumatic.      Right Ear: External ear normal.      Left Ear: External ear normal.   Eyes:      General: No scleral icterus.      Extraocular Movements: Extraocular movements intact.      Conjunctiva/sclera: Conjunctivae normal.   Cardiovascular:      Rate and Rhythm: Normal rate.   Pulmonary:      Effort: Pulmonary effort is normal. No respiratory distress.   Musculoskeletal:      Cervical back: Normal range of motion and neck supple.      Right knee: No effusion.      Comments: See Ortho exam   Skin:     General: Skin is warm and dry.   Neurological:      General: No focal deficit present.      Mental Status: She is alert and oriented to person, place, and time.   Psychiatric:         Behavior: Behavior normal.         I have personally reviewed pertinent films in PACS and my interpretation is as follows:  Right knee x-ray demonstrates no evidence of acute abnormality.      This document was created using speech voice recognition software.   Grammatical errors, random word insertions, pronoun errors, and incomplete sentences are an occasional consequence of this system due to software limitations, ambient noise, and hardware issues.   Any formal questions or concerns about content, text, or information contained within the body of this dictation should be directly addressed to the provider for clarification.

## 2025-02-21 ENCOUNTER — OFFICE VISIT (OUTPATIENT)
Dept: OBGYN CLINIC | Facility: CLINIC | Age: 20
End: 2025-02-21
Payer: COMMERCIAL

## 2025-02-21 VITALS — BODY MASS INDEX: 29.03 KG/M2 | WEIGHT: 185 LBS | HEIGHT: 67 IN

## 2025-02-21 DIAGNOSIS — S82.141A TIBIAL PLATEAU FRACTURE, RIGHT, CLOSED, INITIAL ENCOUNTER: Primary | ICD-10-CM

## 2025-02-21 PROCEDURE — 99213 OFFICE O/P EST LOW 20 MIN: CPT | Performed by: ORTHOPAEDIC SURGERY

## 2025-02-21 NOTE — PROGRESS NOTES
Assessment/Plan:  1. Tibial plateau fracture, right, closed, initial encounter            mIer has right-sided knee pain and MRI demonstrating a small tibial plateau fracture of the medial aspect of the knee with surrounding bone marrow edema.  Since she is now 5 weeks out from her injury and pretty stable and able to ambulate I do think she can continue to heal with conservative treatment and I have recommended use of a hinged knee brace for stability.  She can ambulate as tolerated.  Certainly if pain increases or persist we could have her go on crutches.  I did give her the option of returning to crutches and she declined this currently.  She will likely continue to heal over about 8 to 10 weeks with minimal weightbearing.  She will try to reduce long walks or extensive ambulation at this time.  Follow-up in 1 month.      Subjective:   Rylee Jones is a 19 y.o. female who presents to the office for follow-up for right-sided knee pain.  She had a fall 5 weeks ago on who a train track.  She hit her knee directly off the train track and had increased discomfort throughout the knee.  She was seen in the office 2 weeks ago and we sent her for an MRI she returns for results today and continues to have pain over the medial aspect of the knee.  It is not as severe as it was initially.  She needed to use crutches after the initial injury and now she is able to ambulate.      Review of Systems   Constitutional:  Negative for chills, fever and unexpected weight change.   HENT:  Negative for hearing loss, nosebleeds and sore throat.    Eyes:  Negative for pain, redness and visual disturbance.   Respiratory:  Negative for cough, shortness of breath and wheezing.    Cardiovascular:  Negative for chest pain, palpitations and leg swelling.   Gastrointestinal:  Negative for abdominal pain, nausea and vomiting.   Endocrine: Negative for polydipsia and polyuria.   Genitourinary:  Negative for dysuria and hematuria.    Musculoskeletal:         See HPI   Skin:  Negative for rash and wound.   Neurological:  Negative for dizziness, numbness and headaches.   Psychiatric/Behavioral:  Negative for decreased concentration and suicidal ideas. The patient is not nervous/anxious.          Past Medical History:   Diagnosis Date    Closed fracture of left proximal humerus 1/16/2018    Patient denies medical problems        Past Surgical History:   Procedure Laterality Date    [place] DIAGNOSTIC ARTHROSCOPY SHOULDER +- SYNOVIAL BX Left 4/12/2018    Procedure: ARTHROSCOPY SHOULDER, DIAGNOSTIC;  Surgeon: Scar Jamil MD;  Location: [facility];  Service: Orthopedics       Family History   Problem Relation Age of Onset    No Known Problems Mother     No Known Problems Father        Social History     Occupational History    Not on file   Tobacco Use    Smoking status: Never    Smokeless tobacco: Never   Substance and Sexual Activity    Alcohol use: No    Drug use: No    Sexual activity: Not on file         Current Outpatient Medications:     DULoxetine HCl 40 MG CPEP, Take 1 capsule (40 mg total) by mouth daily, Disp: 30 capsule, Rfl: 0    busPIRone (BUSPAR) 10 mg tablet, Take 1 tablet (10 mg total) by mouth 2 (two) times a day, Disp: 180 tablet, Rfl: 1    spironolactone (ALDACTONE) 50 mg tablet, Take 50 mg by mouth daily, Disp: , Rfl:     No Known Allergies    Objective:  There were no vitals filed for this visit.         Right Knee Exam     Tenderness   The patient is experiencing tenderness in the medial joint line.    Range of Motion   Extension:  normal   Flexion:  normal     Tests   Varus: negative Valgus: negative    Other   Erythema: absent  Sensation: normal  Pulse: present  Swelling: none  Effusion: no effusion present          Observations     Right Knee   Negative for effusion.       Physical Exam  Vitals and nursing note reviewed.   Constitutional:       Appearance: Normal appearance. She is well-developed.   HENT:      Head:  Normocephalic and atraumatic.      Right Ear: External ear normal.      Left Ear: External ear normal.   Eyes:      General: No scleral icterus.     Extraocular Movements: Extraocular movements intact.      Conjunctiva/sclera: Conjunctivae normal.   Cardiovascular:      Rate and Rhythm: Normal rate.   Pulmonary:      Effort: Pulmonary effort is normal. No respiratory distress.   Musculoskeletal:      Cervical back: Normal range of motion and neck supple.      Right knee: No effusion.      Comments: See Ortho exam   Skin:     General: Skin is warm and dry.   Neurological:      General: No focal deficit present.      Mental Status: She is alert and oriented to person, place, and time.   Psychiatric:         Behavior: Behavior normal.         I have personally reviewed pertinent films in PACS and my interpretation is as follows:  MRI of the right knee demonstrates edema and a small trabecular line concerning for nondisplaced tibial plateau fracture.  No evidence of ligamentous injury      This document was created using speech voice recognition software.   Grammatical errors, random word insertions, pronoun errors, and incomplete sentences are an occasional consequence of this system due to software limitations, ambient noise, and hardware issues.   Any formal questions or concerns about content, text, or information contained within the body of this dictation should be directly addressed to the provider for clarification.

## 2025-03-21 ENCOUNTER — OFFICE VISIT (OUTPATIENT)
Dept: OBGYN CLINIC | Facility: CLINIC | Age: 20
End: 2025-03-21
Payer: COMMERCIAL

## 2025-03-21 VITALS — HEIGHT: 67 IN | WEIGHT: 185 LBS | BODY MASS INDEX: 29.03 KG/M2

## 2025-03-21 DIAGNOSIS — S82.141A TIBIAL PLATEAU FRACTURE, RIGHT, CLOSED, INITIAL ENCOUNTER: Primary | ICD-10-CM

## 2025-03-21 PROCEDURE — 99213 OFFICE O/P EST LOW 20 MIN: CPT | Performed by: ORTHOPAEDIC SURGERY

## 2025-03-21 RX ORDER — SPIRONOLACTONE 100 MG/1
TABLET, FILM COATED ORAL
COMMUNITY
Start: 2025-03-20

## 2025-03-21 NOTE — PROGRESS NOTES
Assessment/Plan:  1. Tibial plateau fracture, right, closed, initial encounter            Imer is doing well today and has no significant pain on exam.  She has some slight soreness in the tibia region but this does not inhibit her with activity and she has otherwise full range of motion and strength.  She can discontinue the brace at this time.  She will follow-up if pain returns.    Subjective:   Rylee Jones is a 19 y.o. female who presents to the office for follow-up for a right knee tibial plateau fracture.  She was last in the office 4 weeks ago where she was placed in a hinged knee brace following negative small fracture/contusion which occurred after a fall 6 weeks prior to that.  She is now 10 weeks out from injury and reports very little pain with any ambulation.  She has some discomfort if she tries to kneel on this knee but no other new injuries.      Review of Systems   Constitutional:  Negative for chills, fever and unexpected weight change.   HENT:  Negative for hearing loss, nosebleeds and sore throat.    Eyes:  Negative for pain, redness and visual disturbance.   Respiratory:  Negative for cough, shortness of breath and wheezing.    Cardiovascular:  Negative for chest pain, palpitations and leg swelling.   Gastrointestinal:  Negative for abdominal pain, nausea and vomiting.   Endocrine: Negative for polydipsia and polyuria.   Genitourinary:  Negative for dysuria and hematuria.   Musculoskeletal:         See HPI   Skin:  Negative for rash and wound.   Neurological:  Negative for dizziness, numbness and headaches.   Psychiatric/Behavioral:  Negative for decreased concentration and suicidal ideas. The patient is not nervous/anxious.          Past Medical History:   Diagnosis Date    Closed fracture of left proximal humerus 1/16/2018    Patient denies medical problems        Past Surgical History:   Procedure Laterality Date    [place] DIAGNOSTIC ARTHROSCOPY SHOULDER +- SYNOVIAL BX Left 4/12/2018     Procedure: ARTHROSCOPY SHOULDER, DIAGNOSTIC;  Surgeon: Scar Jamil MD;  Location: [facility];  Service: Orthopedics       Family History   Problem Relation Age of Onset    No Known Problems Mother     No Known Problems Father        Social History     Occupational History    Not on file   Tobacco Use    Smoking status: Never    Smokeless tobacco: Never   Substance and Sexual Activity    Alcohol use: No    Drug use: No    Sexual activity: Not on file         Current Outpatient Medications:     DULoxetine HCl 40 MG CPEP, Take 1 capsule (40 mg total) by mouth daily, Disp: 30 capsule, Rfl: 0    spironolactone (ALDACTONE) 100 mg tablet, , Disp: , Rfl:     busPIRone (BUSPAR) 10 mg tablet, Take 1 tablet (10 mg total) by mouth 2 (two) times a day, Disp: 180 tablet, Rfl: 1    No Known Allergies    Objective:  There were no vitals filed for this visit.         Right Knee Exam     Tenderness   The patient is experiencing no tenderness.     Range of Motion   Extension:  normal   Flexion:  normal     Tests   Alex:  Medial - negative Lateral - negative    Other   Swelling: none  Effusion: no effusion present          Observations     Right Knee   Negative for effusion.       Physical Exam  Vitals and nursing note reviewed.   Constitutional:       Appearance: Normal appearance. She is well-developed.   HENT:      Head: Normocephalic and atraumatic.      Right Ear: External ear normal.      Left Ear: External ear normal.   Eyes:      General: No scleral icterus.     Extraocular Movements: Extraocular movements intact.      Conjunctiva/sclera: Conjunctivae normal.   Cardiovascular:      Rate and Rhythm: Normal rate.   Pulmonary:      Effort: Pulmonary effort is normal. No respiratory distress.   Musculoskeletal:      Cervical back: Normal range of motion and neck supple.      Right knee: No effusion.      Instability Tests: Medial Alex test negative and lateral Alex test negative.      Comments: See Ortho exam    Skin:     General: Skin is warm and dry.   Neurological:      General: No focal deficit present.      Mental Status: She is alert and oriented to person, place, and time.   Psychiatric:         Behavior: Behavior normal.             This document was created using speech voice recognition software.   Grammatical errors, random word insertions, pronoun errors, and incomplete sentences are an occasional consequence of this system due to software limitations, ambient noise, and hardware issues.   Any formal questions or concerns about content, text, or information contained within the body of this dictation should be directly addressed to the provider for clarification.

## 2025-05-09 DIAGNOSIS — F33.1 MODERATE EPISODE OF RECURRENT MAJOR DEPRESSIVE DISORDER (HCC): ICD-10-CM

## 2025-05-09 DIAGNOSIS — F41.1 GAD (GENERALIZED ANXIETY DISORDER): ICD-10-CM

## 2025-05-09 RX ORDER — DULOXETINE 40 MG/1
40 CAPSULE, DELAYED RELEASE ORAL DAILY
Qty: 30 CAPSULE | Refills: 0 | Status: SHIPPED | OUTPATIENT
Start: 2025-05-09 | End: 2025-06-08

## 2025-05-09 NOTE — TELEPHONE ENCOUNTER
Reason for call:   [x] Refill   [] Prior Auth  [] Other:     Office:   [] PCP/Provider -   [x] Specialty/Provider - Psych    Medication:   - Duloxetine 40 mg- Take 1 capsule by mouth daily       Pharmacy: [facility]    Local Pharmacy   Does the patient have enough for 3 days?   [] Yes   [x] No - Send as HP to POD    Mail Away Pharmacy   Does the patient have enough for 10 days?   [] Yes   [] No - Send as HP to POD

## 2025-05-19 ENCOUNTER — TELEPHONE (OUTPATIENT)
Dept: PSYCHIATRY | Facility: CLINIC | Age: 20
End: 2025-05-19

## 2025-05-19 ENCOUNTER — TELEPHONE (OUTPATIENT)
Age: 20
End: 2025-05-19

## 2025-05-19 NOTE — TELEPHONE ENCOUNTER
Called patient but had to leave voicemail message stating that VIRTUAL CONSENT Needs to be signed before appointment today with Sofy Todd PA-C.  We must confirm consent is signed before virtual by 10 am. Or appt can be cancelled. Left call back # [phone].

## 2025-05-19 NOTE — TELEPHONE ENCOUNTER
Patient contacted the office to reschedule a follow up visit with provider, Sofy Todd. Patient is now scheduled for Wednesday 5/21/25 at 11:30 am virtually. Pt signed Virtual Consent Form.

## 2025-05-19 NOTE — TELEPHONE ENCOUNTER
Today's appointment with Sofy Todd PA-C has been cancelled due to Virtual consent forms not being signed.  Several messages have been sent to obtain signature prior to appt.

## 2025-05-21 ENCOUNTER — TELEMEDICINE (OUTPATIENT)
Dept: PSYCHIATRY | Facility: CLINIC | Age: 20
End: 2025-05-21
Payer: COMMERCIAL

## 2025-05-21 DIAGNOSIS — F41.1 GAD (GENERALIZED ANXIETY DISORDER): ICD-10-CM

## 2025-05-21 DIAGNOSIS — F33.1 MODERATE EPISODE OF RECURRENT MAJOR DEPRESSIVE DISORDER (HCC): Primary | ICD-10-CM

## 2025-05-21 PROCEDURE — 99214 OFFICE O/P EST MOD 30 MIN: CPT | Performed by: PHYSICIAN ASSISTANT

## 2025-05-21 RX ORDER — DULOXETINE 40 MG/1
40 CAPSULE, DELAYED RELEASE ORAL DAILY
Qty: 90 CAPSULE | Refills: 0 | Status: SHIPPED | OUTPATIENT
Start: 2025-05-21 | End: 2025-08-19

## 2025-05-21 RX ORDER — BUSPIRONE HYDROCHLORIDE 10 MG/1
10 TABLET ORAL EVERY MORNING
Qty: 90 TABLET | Refills: 0 | Status: SHIPPED | OUTPATIENT
Start: 2025-05-21 | End: 2025-08-19

## 2025-05-21 NOTE — ASSESSMENT & PLAN NOTE
See MDD    Orders:    busPIRone (BUSPAR) 10 mg tablet; Take 1 tablet (10 mg total) by mouth every morning    DULoxetine HCl 40 MG CPEP; Take 1 capsule (40 mg total) by mouth daily

## 2025-05-21 NOTE — PSYCH
MEDICATION MANAGEMENT NOTE    Name: Rylee Jones      : 2005      MRN: [id number]  Encounter Provider: Sofy Todd PA-C  Encounter Date: 2025   Encounter department: [facility]    Insurance: Payor: BLUE CROSS / Plan: [employer] PLAN 280 / Product Type: Blue Fee for Service /      Reason for Visit:   Chief Complaint   Patient presents with    Follow-up    Medication Management    Virtual Regular Visit   :  Assessment & Plan  Moderate episode of recurrent major depressive disorder (HCC)  Continue Cymbalta 40 mg daily for depression and anxiety  Continue BuSpar 10 mg QAM for anxiety   Patient requesting GeneSight testing to aid in future medication changes    Orders:    busPIRone (BUSPAR) 10 mg tablet; Take 1 tablet (10 mg total) by mouth every morning    DULoxetine HCl 40 MG CPEP; Take 1 capsule (40 mg total) by mouth daily    MARIA M (generalized anxiety disorder)  See MDD    Orders:    busPIRone (BUSPAR) 10 mg tablet; Take 1 tablet (10 mg total) by mouth every morning    DULoxetine HCl 40 MG CPEP; Take 1 capsule (40 mg total) by mouth daily        Treatment Recommendations:    Educated about diagnosis and treatment modalities. Verbalizes understanding and agreement with the treatment plan.  Discussed self monitoring of symptoms, and symptom monitoring tools.  Discussed medications and if treatment adjustment was needed or desired.  Medication management every 2 months  Aware of 24 hour and weekend coverage for urgent situations accessed by calling [facility] main practice number  I am scheduling this patient out for greater than 3 months: No    Medications Risks/Benefits:      Risks, Benefits And Possible Side Effects Of Medications:    Risks, benefits, and possible side effects of medications explained to Rylee and she (or legal representative) verbalizes understanding and agreement for treatment.    Controlled Medication  Discussion:     Not applicable      History of Present Illness     Rylee is seen today for a follow up for anxiety and depression.  She reports that she has been busy.  She notes that this was one of her most challenging semesters.  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  She is going on a road trip with her father this summer to [place].  She reports that her mood has been lower over the last few months.  She does feel that she is using coping skills and managing her symptoms fairly well.  She does notice that when she skips her medication she has physical symptoms which she does not like.  She did stop seeing the in office therapist noting it was not a good fit.  She is looking for outside therapy and nutrition resources.  She is interested in Joox testing to help make future medication decisions.    She denies suicidal ideation, intent or plan at present; denies homicidal ideation, intent or plan at present.    She denies auditory hallucinations, denies visual hallucinations, denies delusions.    She denies any side effects from current psychiatric medications.    HPI ROS Appetite Changes and Sleep:     She reports normal sleep, normal appetite, normal energy level    Review Of Systems: A review of systems is obtained and is negative except for the pertinent positives listed in HPI/Subjective above.      Current Rating Scores:     None completed today.    Areas of Improvement: reviewed in HPI/Subjective Section      Past Medical History:   Diagnosis Date    Closed fracture of left proximal humerus 1/16/2018    Patient denies medical problems      Past Surgical History:   Procedure Laterality Date    [place] DIAGNOSTIC ARTHROSCOPY SHOULDER +- SYNOVIAL BX Left 4/12/2018    Procedure: ARTHROSCOPY SHOULDER, DIAGNOSTIC;  Surgeon: Scar Jamil MD;  Location: [facility];  Service: Orthopedics     Allergies: Allergies[1]    Current  Outpatient Medications   Medication Instructions    busPIRone (BUSPAR) 10 mg, Oral, Every morning    DULoxetine HCl 40 mg, Oral, Daily    spironolactone (ALDACTONE) 100 mg tablet         Substance Abuse History:    Tobacco, Alcohol and Drug Use History     Tobacco Use    Smoking status: Never    Smokeless tobacco: Never   Substance Use Topics    Alcohol use: No    Drug use: No          Social History:    Social History     Socioeconomic History    Marital status: Single     Spouse name: Not on file    Number of children: Not on file    Years of education: Not on file    Highest education level: Not on file   Occupational History    Not on file   Other Topics Concern    Not on file   Social History Narrative    Not on file        Family Psychiatric History:     Family History   Problem Relation Age of Onset    No Known Problems Mother     No Known Problems Father        Medical History Reviewed by provider this encounter:  Tobacco  Allergies  Meds  Problems  Med Hx  Surg Hx  Fam Hx          Objective   There were no vitals taken for this visit.     Mental Status Evaluation:    Appearance age appropriate, casually dressed   Behavior pleasant, cooperative, calm   Speech normal rate, normal volume, normal pitch, spontaneous   Mood anxious   Affect normal range and intensity, appropriate   Thought Processes organized, goal directed   Thought Content no overt delusions   Perceptual Disturbances: no auditory hallucinations, no visual hallucinations   Abnormal Thoughts  Risk Potential Suicidal ideation - None at present  Homicidal ideation - None at present  Potential for aggression - No   Orientation oriented to person, place, time/date, and situation   Memory recent and remote memory grossly intact   Consciousness alert and awake   Attention Span Concentration Span attention span and concentration are age appropriate   Intellect appears to be of average intelligence   Insight intact   Judgement intact   Muscle  Strength and  Gait unable to assess today due to virtual visit   Motor activity no abnormal movements   Language no difficulty naming common objects, no difficulty repeating a phrase   Fund of Knowledge adequate knowledge of current events  adequate fund of knowledge regarding past history  adequate fund of knowledge regarding vocabulary        Laboratory Results: not applicable     Suicide/Homicide Risk Assessment:    Risk of Harm to Self:  Based on today's assessment, Rylee presents the following risk of harm to self: none    Risk of Harm to Others:  Based on today's assessment, Rylee presents the following risk of harm to others: none    The following interventions are recommended: Continue medication management. No other intervention changes indicated at this time.    Psychotherapy Provided:     Individual psychotherapy provided: No    Treatment Plan:    Completed and signed during the session: not completed    Goals: Progress towards Treatment Plan goals - in Assessment and Plan Section        Note Share:    This note was not shared with the patient due to this is a psychotherapy note    Administrative Statements   Administrative Statements   Encounter provider Sofy Todd PA-C    The Patient is located at Home and in the [facility] state in which I hold an active license [place].    The patient was identified by name and date of birth. Rylee Jones was informed that this is a telemedicine visit and that the visit is being conducted through the Epic Embedded platform. She agrees to proceed..  My office door was closed. No one else was in the room.  She acknowledged consent and understanding of privacy and security of the video platform. The patient has agreed to participate and understands they can discontinue the visit at any time.    I have spent a total time of 21 minutes in caring for this patient on the day of the visit/encounter including Risks and benefits of tx options, Instructions for management,  "Patient and family education, and Importance of tx compliance, not including the time spent for establishing the audio/video connection.    Visit Time  Visit Start Time: 1130am  Visit Stop Time: 1151am  Total Visit Duration: 21 minutes    Portions of the record may have been created with voice recognition software. Occasional wrong word or \"sound a like\" substitutions may have occurred due to the inherent limitations of voice recognition software. Read the chart carefully and recognize, using context, where substitutions have occurred.    Sofy Todd PA-C 05/21/25       [1] No Known Allergies    "

## 2025-06-19 ENCOUNTER — TELEPHONE (OUTPATIENT)
Dept: PSYCHIATRY | Facility: CLINIC | Age: 20
End: 2025-06-19

## 2025-06-24 DIAGNOSIS — F41.1 GAD (GENERALIZED ANXIETY DISORDER): ICD-10-CM

## 2025-06-24 DIAGNOSIS — F33.1 MODERATE EPISODE OF RECURRENT MAJOR DEPRESSIVE DISORDER (HCC): ICD-10-CM

## 2025-06-24 RX ORDER — BUSPIRONE HYDROCHLORIDE 10 MG/1
10 TABLET ORAL 2 TIMES DAILY
Qty: 180 TABLET | Refills: 1 | OUTPATIENT
Start: 2025-06-24

## 2025-08-04 ENCOUNTER — TELEMEDICINE (OUTPATIENT)
Dept: PSYCHIATRY | Facility: CLINIC | Age: 20
End: 2025-08-04
Payer: COMMERCIAL

## 2025-08-04 ENCOUNTER — TELEPHONE (OUTPATIENT)
Age: 20
End: 2025-08-04

## 2025-08-04 DIAGNOSIS — F33.1 MODERATE EPISODE OF RECURRENT MAJOR DEPRESSIVE DISORDER (HCC): Primary | ICD-10-CM

## 2025-08-04 DIAGNOSIS — F41.1 GAD (GENERALIZED ANXIETY DISORDER): ICD-10-CM

## 2025-08-04 PROCEDURE — 99214 OFFICE O/P EST MOD 30 MIN: CPT | Performed by: PHYSICIAN ASSISTANT

## 2025-08-04 RX ORDER — DULOXETIN HYDROCHLORIDE 20 MG/1
20 CAPSULE, DELAYED RELEASE ORAL DAILY
Qty: 14 CAPSULE | Refills: 0 | Status: SHIPPED | OUTPATIENT
Start: 2025-08-04 | End: 2025-08-18

## 2025-08-04 RX ORDER — DESVENLAFAXINE 50 MG/1
50 TABLET, FILM COATED, EXTENDED RELEASE ORAL DAILY
Qty: 30 TABLET | Refills: 2 | Status: SHIPPED | OUTPATIENT
Start: 2025-08-04 | End: 2025-11-02

## (undated) DEVICE — TUBING SUCTION 5MM X 12 FT

## (undated) DEVICE — 3M™ TEGADERM™ TRANSPARENT FILM DRESSING FRAME STYLE, 1626W, 4 IN X 4-3/4 IN (10 CM X 12 CM), 50/CT 4CT/CASE: Brand: 3M™ TEGADERM™

## (undated) DEVICE — TIBURON BEACH CHAIR SHOULDER DRAPE: Brand: CONVERTORS

## (undated) DEVICE — TUBING ARTHROSCOPIC WAVE  MAIN PUMP

## (undated) DEVICE — PACK ARTHROSCOPY

## (undated) DEVICE — GLOVE INDICATOR PI UNDERGLOVE SZ 6.5 BLUE

## (undated) DEVICE — ASTOUND IMPERVIOUS SURGICAL GOWN: Brand: CONVERTORS

## (undated) DEVICE — OCCLUSIVE GAUZE STRIP,3% BISMUTH TRIBROMOPHENATE IN PETROLATUM BLEND: Brand: XEROFORM

## (undated) DEVICE — BLADE SHAVER EXCALIBUR 4MM 13CM COOLCUT

## (undated) DEVICE — DUAL SPIKE ADAPTER: Brand: CONMED

## (undated) DEVICE — GLOVE INDICATOR PI UNDERGLOVE SZ 7.5 BLUE

## (undated) DEVICE — Device

## (undated) DEVICE — LABEL MEDICATION STERILE 2 YELLOW LIDOCAINE 2 BLUE MARCAINE 2 ORANGE HEPARIN

## (undated) DEVICE — POSITIONER TRIMANO LIMB BEACH CHAIR

## (undated) DEVICE — CHLORAPREP HI-LITE 26ML ORANGE

## (undated) DEVICE — CANNULA 5.75 X 70MM BARREL SHAPED BOWL

## (undated) DEVICE — SCD SEQUENTIAL COMPRESSION COMFORT SLEEVE MEDIUM KNEE LENGTH: Brand: KENDALL SCD

## (undated) DEVICE — BURR  OVAL 4MM 13CM 8 FLUTE COOLCUT

## (undated) DEVICE — GLOVE SRG BIOGEL 6.5

## (undated) DEVICE — INTENDED FOR TISSUE SEPARATION, AND OTHER PROCEDURES THAT REQUIRE A SHARP SURGICAL BLADE TO PUNCTURE OR CUT.: Brand: BARD-PARKER SAFETY BLADES SIZE 11, STERILE

## (undated) DEVICE — GLOVE SRG BIOGEL 7.5

## (undated) DEVICE — SYRINGE 50ML LL